# Patient Record
Sex: FEMALE | Race: BLACK OR AFRICAN AMERICAN | NOT HISPANIC OR LATINO
[De-identification: names, ages, dates, MRNs, and addresses within clinical notes are randomized per-mention and may not be internally consistent; named-entity substitution may affect disease eponyms.]

---

## 2017-02-14 ENCOUNTER — RESULT REVIEW (OUTPATIENT)
Age: 52
End: 2017-02-14

## 2017-02-15 ENCOUNTER — OTHER (OUTPATIENT)
Age: 52
End: 2017-02-15

## 2017-04-11 ENCOUNTER — OUTPATIENT (OUTPATIENT)
Dept: OUTPATIENT SERVICES | Facility: HOSPITAL | Age: 52
LOS: 1 days | End: 2017-04-11
Payer: COMMERCIAL

## 2017-04-11 VITALS
RESPIRATION RATE: 16 BRPM | SYSTOLIC BLOOD PRESSURE: 143 MMHG | TEMPERATURE: 99 F | WEIGHT: 169.09 LBS | DIASTOLIC BLOOD PRESSURE: 82 MMHG | HEIGHT: 66 IN

## 2017-04-11 DIAGNOSIS — Z98.89 OTHER SPECIFIED POSTPROCEDURAL STATES: Chronic | ICD-10-CM

## 2017-04-11 DIAGNOSIS — D25.0 SUBMUCOUS LEIOMYOMA OF UTERUS: ICD-10-CM

## 2017-04-11 DIAGNOSIS — Z90.89 ACQUIRED ABSENCE OF OTHER ORGANS: Chronic | ICD-10-CM

## 2017-04-11 DIAGNOSIS — Z01.818 ENCOUNTER FOR OTHER PREPROCEDURAL EXAMINATION: ICD-10-CM

## 2017-04-11 DIAGNOSIS — D25.9 LEIOMYOMA OF UTERUS, UNSPECIFIED: ICD-10-CM

## 2017-04-11 LAB
ANION GAP SERPL CALC-SCNC: 10 MMOL/L — SIGNIFICANT CHANGE UP (ref 5–17)
BUN SERPL-MCNC: 10 MG/DL — SIGNIFICANT CHANGE UP (ref 7–23)
CALCIUM SERPL-MCNC: 9 MG/DL — SIGNIFICANT CHANGE UP (ref 8.5–10.1)
CHLORIDE SERPL-SCNC: 107 MMOL/L — SIGNIFICANT CHANGE UP (ref 96–108)
CO2 SERPL-SCNC: 24 MMOL/L — SIGNIFICANT CHANGE UP (ref 22–31)
CREAT SERPL-MCNC: 0.87 MG/DL — SIGNIFICANT CHANGE UP (ref 0.5–1.3)
GLUCOSE SERPL-MCNC: 97 MG/DL — SIGNIFICANT CHANGE UP (ref 70–99)
HCG SERPL-ACNC: 2 MIU/ML — SIGNIFICANT CHANGE UP
HCT VFR BLD CALC: 23.8 % — LOW (ref 34.5–45)
HGB BLD-MCNC: 6.3 G/DL — CRITICAL LOW (ref 11.5–15.5)
MCHC RBC-ENTMCNC: 16.6 PG — LOW (ref 27–34)
MCHC RBC-ENTMCNC: 26.4 GM/DL — LOW (ref 32–36)
MCV RBC AUTO: 63.1 FL — LOW (ref 80–100)
PLATELET # BLD AUTO: 572 K/UL — HIGH (ref 150–400)
POTASSIUM SERPL-MCNC: 3.7 MMOL/L — SIGNIFICANT CHANGE UP (ref 3.5–5.3)
POTASSIUM SERPL-SCNC: 3.7 MMOL/L — SIGNIFICANT CHANGE UP (ref 3.5–5.3)
RBC # BLD: 3.78 M/UL — LOW (ref 3.8–5.2)
RBC # FLD: 17.9 % — HIGH (ref 10.3–14.5)
SODIUM SERPL-SCNC: 141 MMOL/L — SIGNIFICANT CHANGE UP (ref 135–145)
WBC # BLD: 7.6 K/UL — SIGNIFICANT CHANGE UP (ref 3.8–10.5)
WBC # FLD AUTO: 7.6 K/UL — SIGNIFICANT CHANGE UP (ref 3.8–10.5)

## 2017-04-11 PROCEDURE — 93010 ELECTROCARDIOGRAM REPORT: CPT | Mod: NC

## 2017-04-11 PROCEDURE — 80048 BASIC METABOLIC PNL TOTAL CA: CPT

## 2017-04-11 PROCEDURE — 93005 ELECTROCARDIOGRAM TRACING: CPT

## 2017-04-11 PROCEDURE — 86900 BLOOD TYPING SEROLOGIC ABO: CPT

## 2017-04-11 PROCEDURE — 85027 COMPLETE CBC AUTOMATED: CPT

## 2017-04-11 PROCEDURE — 86901 BLOOD TYPING SEROLOGIC RH(D): CPT

## 2017-04-11 PROCEDURE — G0463: CPT

## 2017-04-11 PROCEDURE — 84702 CHORIONIC GONADOTROPIN TEST: CPT

## 2017-04-11 PROCEDURE — 86850 RBC ANTIBODY SCREEN: CPT

## 2017-04-11 NOTE — H&P PST ADULT - NEGATIVE CARDIOVASCULAR SYMPTOMS
no orthopnea/no dyspnea on exertion/no peripheral edema/no paroxysmal nocturnal dyspnea/no chest pain/no palpitations/no claudication

## 2017-04-11 NOTE — H&P PST ADULT - NSANTHOSAYNRD_GEN_A_CORE
No. ANIKA screening performed.  STOP BANG Legend: 0-2 = LOW Risk; 3-4 = INTERMEDIATE Risk; 5-8 = HIGH Risk

## 2017-04-11 NOTE — H&P PST ADULT - HISTORY OF PRESENT ILLNESS
50 y/o female with h/o uterine fibroid, had D&C in Oct 2017, was very anemic at that time, stopped the heavy bleeds for little while, then started bleeding again since April 2017 until today, feels the blood count is going down too. Denies any palpitation or chest pain. Had 2 untis of packed cells in Oct 2016.   Now in PST pre op total abdominal hysterectomy and bilateral salpingectomy. pre op testing today

## 2017-04-11 NOTE — H&P PST ADULT - RS GEN PE MLT RESP DETAILS PC
good air movement/respirations non-labored/normal/breath sounds equal/airway patent/clear to auscultation bilaterally

## 2017-04-11 NOTE — H&P PST ADULT - ASSESSMENT
50 y/o female with c/o of heavy menstrual bleeding , diagnosed with uterine fibroids, had d&C in the past, continues to bleed, now scheduled for total abdominal hysterectomy and salpingectomy. Pre op testing today. Medical eval advised.

## 2017-04-11 NOTE — H&P PST ADULT - NEGATIVE GASTROINTESTINAL SYMPTOMS
no constipation/no nausea/no vomiting/no abdominal pain/no diarrhea/no steatorrhea/no jaundice/no hematochezia/no hiccoughs/no melena

## 2017-04-11 NOTE — H&P PST ADULT - FAMILY HISTORY
Mother  Still living? Unknown  Family history of diabetes mellitus, Age at diagnosis: Age Unknown  Family history of hypertension, Age at diagnosis: Age Unknown

## 2017-04-13 ENCOUNTER — INPATIENT (INPATIENT)
Facility: HOSPITAL | Age: 52
LOS: 2 days | Discharge: HOME | DRG: 743 | End: 2017-04-16
Attending: INTERNAL MEDICINE | Admitting: INTERNAL MEDICINE
Payer: COMMERCIAL

## 2017-04-13 ENCOUNTER — RESULT REVIEW (OUTPATIENT)
Age: 52
End: 2017-04-13

## 2017-04-13 VITALS
DIASTOLIC BLOOD PRESSURE: 64 MMHG | HEART RATE: 83 BPM | RESPIRATION RATE: 13 BRPM | TEMPERATURE: 99 F | WEIGHT: 169.09 LBS | HEIGHT: 66 IN | SYSTOLIC BLOOD PRESSURE: 109 MMHG

## 2017-04-13 DIAGNOSIS — Z98.89 OTHER SPECIFIED POSTPROCEDURAL STATES: Chronic | ICD-10-CM

## 2017-04-13 DIAGNOSIS — D25.9 LEIOMYOMA OF UTERUS, UNSPECIFIED: ICD-10-CM

## 2017-04-13 DIAGNOSIS — E87.6 HYPOKALEMIA: ICD-10-CM

## 2017-04-13 DIAGNOSIS — Z29.9 ENCOUNTER FOR PROPHYLACTIC MEASURES, UNSPECIFIED: ICD-10-CM

## 2017-04-13 DIAGNOSIS — N93.9 ABNORMAL UTERINE AND VAGINAL BLEEDING, UNSPECIFIED: ICD-10-CM

## 2017-04-13 DIAGNOSIS — I10 ESSENTIAL (PRIMARY) HYPERTENSION: ICD-10-CM

## 2017-04-13 DIAGNOSIS — D64.89 OTHER SPECIFIED ANEMIAS: ICD-10-CM

## 2017-04-13 DIAGNOSIS — Z90.89 ACQUIRED ABSENCE OF OTHER ORGANS: Chronic | ICD-10-CM

## 2017-04-13 LAB
ALBUMIN SERPL ELPH-MCNC: 3.6 G/DL — SIGNIFICANT CHANGE UP (ref 3.3–5)
ALP SERPL-CCNC: 51 U/L — SIGNIFICANT CHANGE UP (ref 40–120)
ALT FLD-CCNC: 17 U/L — SIGNIFICANT CHANGE UP (ref 12–78)
ANION GAP SERPL CALC-SCNC: 10 MMOL/L — SIGNIFICANT CHANGE UP (ref 5–17)
ANISOCYTOSIS BLD QL: SLIGHT — SIGNIFICANT CHANGE UP
APPEARANCE UR: CLEAR — SIGNIFICANT CHANGE UP
APTT BLD: 23.8 SEC — LOW (ref 27.5–37.4)
AST SERPL-CCNC: 10 U/L — LOW (ref 15–37)
BACTERIA # UR AUTO: ABNORMAL
BASOPHILS # BLD AUTO: 0.1 K/UL — SIGNIFICANT CHANGE UP (ref 0–0.2)
BASOPHILS NFR BLD AUTO: 1 % — SIGNIFICANT CHANGE UP (ref 0–2)
BILIRUB SERPL-MCNC: 1.2 MG/DL — SIGNIFICANT CHANGE UP (ref 0.2–1.2)
BILIRUB UR-MCNC: NEGATIVE — SIGNIFICANT CHANGE UP
BLD GP AB SCN SERPL QL: SIGNIFICANT CHANGE UP
BUN SERPL-MCNC: 10 MG/DL — SIGNIFICANT CHANGE UP (ref 7–23)
CALCIUM SERPL-MCNC: 8.7 MG/DL — SIGNIFICANT CHANGE UP (ref 8.5–10.1)
CHLORIDE SERPL-SCNC: 105 MMOL/L — SIGNIFICANT CHANGE UP (ref 96–108)
CO2 SERPL-SCNC: 26 MMOL/L — SIGNIFICANT CHANGE UP (ref 22–31)
COLOR SPEC: YELLOW — SIGNIFICANT CHANGE UP
CREAT SERPL-MCNC: 0.9 MG/DL — SIGNIFICANT CHANGE UP (ref 0.5–1.3)
DACRYOCYTES BLD QL SMEAR: SLIGHT — SIGNIFICANT CHANGE UP
DIFF PNL FLD: NEGATIVE — SIGNIFICANT CHANGE UP
ELLIPTOCYTES BLD QL SMEAR: SIGNIFICANT CHANGE UP
EOSINOPHIL # BLD AUTO: 0 K/UL — SIGNIFICANT CHANGE UP (ref 0–0.5)
EOSINOPHIL NFR BLD AUTO: 0.4 % — SIGNIFICANT CHANGE UP (ref 0–6)
EPI CELLS # UR: SIGNIFICANT CHANGE UP
GLUCOSE SERPL-MCNC: 85 MG/DL — SIGNIFICANT CHANGE UP (ref 70–99)
GLUCOSE UR QL: NEGATIVE — SIGNIFICANT CHANGE UP
HCG SERPL-ACNC: 1 MIU/ML — SIGNIFICANT CHANGE UP
HCT VFR BLD CALC: 22.9 % — LOW (ref 34.5–45)
HGB BLD-MCNC: 6.3 G/DL — CRITICAL LOW (ref 11.5–15.5)
HYPOCHROMIA BLD QL: SIGNIFICANT CHANGE UP
INR BLD: 1.24 RATIO — HIGH (ref 0.88–1.16)
KETONES UR-MCNC: NEGATIVE — SIGNIFICANT CHANGE UP
LEUKOCYTE ESTERASE UR-ACNC: NEGATIVE — SIGNIFICANT CHANGE UP
LYMPHOCYTES # BLD AUTO: 1.7 K/UL — SIGNIFICANT CHANGE UP (ref 1–3.3)
LYMPHOCYTES # BLD AUTO: 20.7 % — SIGNIFICANT CHANGE UP (ref 13–44)
MCHC RBC-ENTMCNC: 17.4 PG — LOW (ref 27–34)
MCHC RBC-ENTMCNC: 27.6 GM/DL — LOW (ref 32–36)
MCV RBC AUTO: 62.8 FL — LOW (ref 80–100)
MICROCYTES BLD QL: SLIGHT — SIGNIFICANT CHANGE UP
MONOCYTES # BLD AUTO: 1.3 K/UL — HIGH (ref 0–0.9)
MONOCYTES NFR BLD AUTO: 15.6 % — HIGH (ref 1–9)
NEUTROPHILS # BLD AUTO: 5.2 K/UL — SIGNIFICANT CHANGE UP (ref 1.8–7.4)
NEUTROPHILS NFR BLD AUTO: 62.3 % — SIGNIFICANT CHANGE UP (ref 43–77)
NITRITE UR-MCNC: NEGATIVE — SIGNIFICANT CHANGE UP
PH UR: 5 — SIGNIFICANT CHANGE UP (ref 4.8–8)
PLAT MORPH BLD: NORMAL — SIGNIFICANT CHANGE UP
PLATELET # BLD AUTO: 711 K/UL — HIGH (ref 150–400)
POIKILOCYTOSIS BLD QL AUTO: SIGNIFICANT CHANGE UP
POLYCHROMASIA BLD QL SMEAR: SLIGHT — SIGNIFICANT CHANGE UP
POTASSIUM SERPL-MCNC: 3.2 MMOL/L — LOW (ref 3.5–5.3)
POTASSIUM SERPL-SCNC: 3.2 MMOL/L — LOW (ref 3.5–5.3)
PROT SERPL-MCNC: 7.1 G/DL — SIGNIFICANT CHANGE UP (ref 6–8.3)
PROT UR-MCNC: 25 MG/DL
PROTHROM AB SERPL-ACNC: 13.6 SEC — HIGH (ref 9.8–12.7)
RBC # BLD: 3.65 M/UL — LOW (ref 3.8–5.2)
RBC # FLD: 18 % — HIGH (ref 10.3–14.5)
RBC BLD AUTO: ABNORMAL
SODIUM SERPL-SCNC: 141 MMOL/L — SIGNIFICANT CHANGE UP (ref 135–145)
SP GR SPEC: 1.02 — SIGNIFICANT CHANGE UP (ref 1.01–1.02)
TARGETS BLD QL SMEAR: SLIGHT — SIGNIFICANT CHANGE UP
UROBILINOGEN FLD QL: 1
WBC # BLD: 8.4 K/UL — SIGNIFICANT CHANGE UP (ref 3.8–10.5)
WBC # FLD AUTO: 8.4 K/UL — SIGNIFICANT CHANGE UP (ref 3.8–10.5)
WBC UR QL: SIGNIFICANT CHANGE UP

## 2017-04-13 PROCEDURE — 93010 ELECTROCARDIOGRAM REPORT: CPT

## 2017-04-13 PROCEDURE — 99223 1ST HOSP IP/OBS HIGH 75: CPT | Mod: AI,GC

## 2017-04-13 PROCEDURE — 99285 EMERGENCY DEPT VISIT HI MDM: CPT

## 2017-04-13 PROCEDURE — 71020: CPT | Mod: 26

## 2017-04-13 RX ORDER — ACETAMINOPHEN 500 MG
650 TABLET ORAL ONCE
Qty: 0 | Refills: 0 | Status: COMPLETED | OUTPATIENT
Start: 2017-04-13 | End: 2017-04-13

## 2017-04-13 RX ORDER — SODIUM CHLORIDE 9 MG/ML
1000 INJECTION INTRAMUSCULAR; INTRAVENOUS; SUBCUTANEOUS ONCE
Qty: 0 | Refills: 0 | Status: COMPLETED | OUTPATIENT
Start: 2017-04-13 | End: 2017-04-13

## 2017-04-13 RX ORDER — FEXOFENADINE HCL 30 MG
1 TABLET ORAL
Qty: 0 | Refills: 0 | COMMUNITY

## 2017-04-13 RX ORDER — SODIUM CHLORIDE 9 MG/ML
1000 INJECTION INTRAMUSCULAR; INTRAVENOUS; SUBCUTANEOUS
Qty: 0 | Refills: 0 | Status: DISCONTINUED | OUTPATIENT
Start: 2017-04-13 | End: 2017-04-13

## 2017-04-13 RX ORDER — IBUPROFEN 200 MG
0 TABLET ORAL
Qty: 0 | Refills: 0 | COMMUNITY

## 2017-04-13 RX ORDER — TRANEXAMIC ACID 100 MG/ML
1300 INJECTION, SOLUTION INTRAVENOUS THREE TIMES A DAY
Qty: 0 | Refills: 0 | Status: DISCONTINUED | OUTPATIENT
Start: 2017-04-13 | End: 2017-04-14

## 2017-04-13 RX ORDER — ACETAMINOPHEN 500 MG
650 TABLET ORAL EVERY 6 HOURS
Qty: 0 | Refills: 0 | Status: DISCONTINUED | OUTPATIENT
Start: 2017-04-13 | End: 2017-04-14

## 2017-04-13 RX ORDER — SODIUM CHLORIDE 9 MG/ML
3 INJECTION INTRAMUSCULAR; INTRAVENOUS; SUBCUTANEOUS ONCE
Qty: 0 | Refills: 0 | Status: COMPLETED | OUTPATIENT
Start: 2017-04-13 | End: 2017-04-13

## 2017-04-13 RX ORDER — ACETAMINOPHEN 500 MG
650 TABLET ORAL ONCE
Qty: 0 | Refills: 0 | Status: DISCONTINUED | OUTPATIENT
Start: 2017-04-13 | End: 2017-04-13

## 2017-04-13 RX ORDER — ONDANSETRON 8 MG/1
4 TABLET, FILM COATED ORAL EVERY 6 HOURS
Qty: 0 | Refills: 0 | Status: DISCONTINUED | OUTPATIENT
Start: 2017-04-13 | End: 2017-04-14

## 2017-04-13 RX ORDER — CEFAZOLIN SODIUM 1 G
2000 VIAL (EA) INJECTION ONCE
Qty: 0 | Refills: 0 | Status: DISCONTINUED | OUTPATIENT
Start: 2017-04-13 | End: 2017-04-13

## 2017-04-13 RX ORDER — POTASSIUM CHLORIDE 20 MEQ
40 PACKET (EA) ORAL ONCE
Qty: 0 | Refills: 0 | Status: COMPLETED | OUTPATIENT
Start: 2017-04-13 | End: 2017-04-13

## 2017-04-13 RX ADMIN — SODIUM CHLORIDE 1000 MILLILITER(S): 9 INJECTION INTRAMUSCULAR; INTRAVENOUS; SUBCUTANEOUS at 16:58

## 2017-04-13 RX ADMIN — Medication 650 MILLIGRAM(S): at 22:51

## 2017-04-13 RX ADMIN — Medication 40 MILLIEQUIVALENT(S): at 20:27

## 2017-04-13 RX ADMIN — SODIUM CHLORIDE 3 MILLILITER(S): 9 INJECTION INTRAMUSCULAR; INTRAVENOUS; SUBCUTANEOUS at 16:57

## 2017-04-13 NOTE — ED ADULT NURSE NOTE - OBJECTIVE STATEMENT
Pt to ED c/o vaginal bleed, scheduled for histo tmrw was called by PMD for low HGB/HCT, DNC in 1/17, afebrile, skin intact, will continue to monitor

## 2017-04-13 NOTE — H&P ADULT - PMH
Anemia    Environmental allergies    Fibrocystic breast changes    Fibroid    HTN (hypertension)    Leiomyoma of uterus, unspecified

## 2017-04-13 NOTE — ED PROVIDER NOTE - MEDICAL DECISION MAKING DETAILS
51 female with heavy vaginal bleeding, needs blood transfusion, admission for NILAM, f/u labs, ekg, chest xray

## 2017-04-13 NOTE — H&P ADULT - FAMILY HISTORY
Mother  Still living? Unknown  Family history of diabetes mellitus, Age at diagnosis: Age Unknown  Family history of hypertension, Age at diagnosis: Age Unknown  Family history of hyperlipidemia, Age at diagnosis: Age Unknown  Family history of heart disease, Age at diagnosis: Age Unknown

## 2017-04-13 NOTE — ED PROVIDER NOTE - PMH
Anemia    Environmental allergies    Fibroid    HTN (hypertension)    Leiomyoma of uterus, unspecified

## 2017-04-13 NOTE — H&P ADULT - HISTORY OF PRESENT ILLNESS
50 yo F with PMH of HTN, Migraines, Fibrocystic breast changes presented to the ED with complaints of worsening vaginal bleeding. Pt has experienced vaginal bleeding over the past 5-6 years. Bleeding began to worsen in August-Sept 2016, which she had gone to the ED and received 3 units pRBCs before being sent home. In Oct 2016, she had a D&C, and her vaginal bleeding had returned to baseline, as it remained but was manageable, until April 2017. Vaginal bleeding has worsened over the past two weeks and patient had been managed outpatient with her GYN Dr. Dumont. Scheduled for NILAM tomorrow at [facility], but due to complaints of nausea, vomiting, dyspnea on exertion, dizziness, fatigue, dry cough, lack of appetite, and headache, patient came to the ED. Denies fever, chills, chest pain, abd pain, constipation, or diarrhea. Otherwise, patient remains stable without further complaints. 52 yo F with PMH of HTN, Migraines, Fibrocystic breast changes presented to the ED with complaints of worsening vaginal bleeding. Pt has experienced vaginal bleeding over the past 5-6 years. Bleeding began to worsen in August-Sept 2016, at which point she had gone to the ED and received 3 units pRBCs before being sent home. In Oct 2016, she had a D&C, and her vaginal bleeding had returned to baseline, as it remained but was manageable, until April 2017. Vaginal bleeding has worsened over the past two weeks and patient had been managed outpatient with her GYN Dr. Dumont. Scheduled for NILAM tomorrow at [facility] [place], but due to complaints of nausea, vomiting, dyspnea on exertion, dizziness, fatigue, dry cough, lack of appetite, and headache, patient came to the ED. Denies fever, chills, chest pain, abd pain, constipation, or diarrhea. Otherwise, patient remains stable without further complaints.    In the ED:  VS hemodynamically stable.   EKG showed NSR at 74.   CXR negative.   Labs: H/H 6.3/23.9, Plt 711, INR 1.24, K 3.2.   Received NS 1L Bolus x1, KCl 40mEq PO x1. 52 yo F with PMH of HTN, Migraines, Fibrocystic breast changes, uterine fibroids presented to the ED with complaints of worsening vaginal bleeding. Pt has experienced vaginal bleeding over the past 5-6 years. Bleeding began to worsen in August-Sept 2016, at which point she had gone to the ED and received 3 units pRBCs before being sent home. In Oct 2016, she had a D&C, and her vaginal bleeding had returned to baseline, as it remained but was manageable, until April 2017. Vaginal bleeding has worsened over the past two weeks and patient had been managed outpatient with her GYN Dr. Dumont. Scheduled for NILAM tomorrow at [facility], but due to complaints of nausea, vomiting, dyspnea on exertion, dizziness, fatigue, dry cough, lack of appetite, and headache, patient came to the ED. Denies fever, chills, chest pain, abd pain, palpitations, constipation, or diarrhea. Otherwise, patient remains stable without further complaints.    In the ED:  VS hemodynamically stable.   EKG showed NSR at 74.   CXR negative.   Labs: H/H 6.3/23.9, Plt 711, INR 1.24, K 3.2.   Received NS 1L Bolus x1, KCl 40mEq PO x1.

## 2017-04-13 NOTE — H&P ADULT - ATTENDING COMMENTS
Patient was seen and examined with medicine team. Agree with above history and physical, assessment and plan. Edited where appropriate.     52 yo F with PMH of HTN, Migraines, Fibrocystic breast changes, uterine fibroids admitted to Tele with Symptomatic anemia 2/2 vaginal bleeding. Transfuse 2 units prbc and monitor h/h. Plan for hysterectomy in AM on 4/14/17. Case discussed with  (ob/gyn). Attempted to call pmd Dr.Sonia Butelr however voicemail was unavailable. Will monitor patient closely on telemetry floor.

## 2017-04-13 NOTE — H&P ADULT - NSHPPHYSICALEXAM_GEN_ALL_CORE
General: Well developed, well nourished, NAD  HEENT: NCAT, PERRLA, EOMI bl, moist mucous membranes   Neck: Supple, nontender, no mass  Neurology: A&Ox3, nonfocal, CN II-XII grossly intact, sensation intact, no gait abnormalities   Respiratory: CTA B/L, No W/R/R  CV: RRR, +S1/S2, no murmurs, rubs or gallops  Abdominal: +Mild tenderness to palpation in lower quadrants b/l, Soft, ND +BSx4  Extremities: No C/C/E, + peripheral pulses  MSK: Normal ROM, no joint erythema or warmth, no joint swelling   Skin: warm, dry, normal color, no rash or abnormal lesions Vital Signs Last 24 Hrs  T(C): 37.3, Max: 37.3 (04-13 @ 15:13)  T(F): 99.1, Max: 99.1 (04-13 @ 15:13)  HR: 83 (83 - 83)  BP: 109/64 (109/64 - 109/64)  BP(mean): --  RR: 13 (13 - 13)  SpO2: --    General: Well developed, well nourished, NAD  HEENT: NCAT, PERRLA, EOMI bl, moist mucous membranes   Neck: Supple, nontender, no mass  Neurology: A&Ox3, nonfocal, CN II-XII grossly intact, sensation intact, no gait abnormalities   Respiratory: CTA B/L, No W/R/R  CV: RRR, +S1/S2, no murmurs, rubs or gallops  Abdominal: +Mild tenderness to palpation in lower quadrants b/l, Soft, ND +BSx4  Extremities: No C/C/E, + peripheral pulses  MSK: Normal ROM, no joint erythema or warmth, no joint swelling   Skin: warm, dry, normal color, no rash or abnormal lesions Vital Signs Last 24 Hrs  T(C): 37.3, Max: 37.3 (04-13 @ 15:13)  T(F): 99.1, Max: 99.1 (04-13 @ 15:13)  HR: 83 (83 - 83)  BP: 109/64 (109/64 - 109/64)  BP(mean): --  RR: 13 (13 - 13)  SpO2: 100% on RA     General: Well developed, well nourished, NAD  HEENT: NCAT, PERRLA, EOMI bl, moist mucous membranes, pale conjunctiva b/l    Neck: Supple, nontender, no mass  Neurology: A&Ox3, nonfocal, CN II-XII grossly intact, sensation intact, no gait abnormalities   Respiratory: CTA B/L, No W/R/R  CV: RRR, +S1/S2, no murmurs, rubs or gallops  Abdominal: +Mild tenderness to palpation in lower quadrants b/l, Soft, ND +BSx4, no rebound or guarding   Extremities: No C/C/E, + peripheral pulses  MSK: Normal ROM, no joint erythema or warmth, no joint swelling   Skin: warm, dry, normal color, no rash or abnormal lesions

## 2017-04-13 NOTE — H&P ADULT - NSHPREVIEWOFSYSTEMS_GEN_ALL_CORE
Constitutional: denies fever, chills, general malaise, weight loss, weight gain, diaphoresis   HEENT: denies dry mouth, sore throat, runny nose, photophobia, blurry vision, double vision, discharge, eye pain, difficulty hearing, vertigo, dysphagia, epistaxis, recent dental work    Respiratory: denies SOB, MUNGUIA, cough, sputum production, wheezing, hemoptysis  Cardiovascular: denies CP, palpitations, edema  Gastrointestinal: denies nausea, vomiting, diarrhea, constipation, abdominal pain, melena, hematochezia   Genitourinary: denies dysuria, frequency, urgency, incontinence, hematuria   Skin/Breast: denies rash, hives, itching, masses, hair loss   Musculoskeletal: denies myalgias, arthritis, joint swelling, muscle weakness  Neurologic: denies syncope, LOC, headache, weakness, dizziness, paresthesias, numbness, seizures, confusion, dementia   Psychiatric: denies feeling anxious, depressed, suicidal, homicidal thoughts  Endocrine: denies increased fingerstick glucoses, cold or heat intolerance, polydipsia, polyuria, polyphagia   Hematology/Oncology: denies bruising, tender or enlarged lymph nodes   ROS negative except as noted above Constitutional: +Fatigue; denies fever, chills, general malaise, weight loss, weight gain, diaphoresis   HEENT: denies dry mouth, sore throat, runny nose, photophobia, blurry vision, double vision, discharge, eye pain, difficulty hearing, vertigo, dysphagia, epistaxis, recent dental work    Respiratory: +MUNGUIA, nonproductive cough; denies SOB, cough, sputum production, wheezing, hemoptysis  Cardiovascular: denies CP, palpitations, edema  Gastrointestinal: +N/V, lack of appetite; denies diarrhea, constipation, abdominal pain, melena, hematochezia   Genitourinary: denies dysuria, frequency, urgency, incontinence, hematuria   Skin/Breast: denies rash, hives, itching, masses, hair loss   Musculoskeletal: denies myalgias, arthritis, joint swelling, muscle weakness  Neurologic: +Dizziness, headache; denies syncope, LOC, weakness, paresthesias, numbness, seizures, confusion, dementia   Psychiatric: denies feeling anxious, depressed, suicidal, homicidal thoughts  Endocrine: denies increased fingerstick glucoses, cold or heat intolerance, polydipsia, polyuria, polyphagia   Hematology/Oncology: denies bruising, tender or enlarged lymph nodes   ROS negative except as noted above

## 2017-04-13 NOTE — H&P ADULT - PROBLEM SELECTOR PLAN 1
-2/2 Vaginal Bleeding 2/2 Fibroids.  -Admit to Tele.  -Ordered 2 units pRBCs, will transfuse with additional units as needed.  -Trend H/H.  -Scheduled for NILAM tomorrow.  -DASH diet, NPO after midnight.  -Tylenol prn fever, pain.  -Zofran prn n/v.  -c/w Lysteda (advised to bring in home medication as not carried by pharmacy).  -f/u AM labs.  -f/u GYN recs. -2/2 Vaginal Bleeding 2/2 uterine Fibroids.  -Admit to Tele.  -Ordered 2 units pRBCs, will transfuse with additional units as needed.  -Trend H/H.  -Scheduled for NILAM tomorrow.  -DASH diet, NPO after midnight.  -Tylenol prn fever, pain.  -Zofran prn n/v.  -c/w Lysteda (advised to bring in home medication as not carried by pharmacy).  -f/u AM labs.  -f/u GYN recs-

## 2017-04-13 NOTE — ED PROVIDER NOTE - OBJECTIVE STATEMENT
51 female h/o fibroids sent to ER by Dr. Dumont GYN for admission for NILAM. Patient states she has been having heavy vaginal bleeding since 4/1/17, with clots, has been feeling light headed, dizziness, shortness of breath with exertion, nausea, at times vomiting, decreased appetite, feels better with rest, had gone to see Dr. Dumont on Monday, had pre-surgical testing done on Tuesday, received a phone call that her Hg was 6.3 and told to go to ER for admission. Patient states today vaginal bleeding is not as heavy, has mild lower abdominal cramping.

## 2017-04-13 NOTE — H&P ADULT - PROBLEM SELECTOR PLAN 4
-BP stable in ED.  -Will hold HTN meds at this time.  -Monitor VS. -BP stable in ED.  -Will hold HTN meds at this time and restart in AM if vitals stable. Patient also took her BP meds at home this AM   -Monitor VS.

## 2017-04-13 NOTE — H&P ADULT - ASSESSMENT
52 yo F with PMH of HTN, Migraines, Fibrocystic breast changes admitted to Tele with Symptomatic anemia 2/2 vaginal bleeding. 50 yo F with PMH of HTN, Migraines, Fibrocystic breast changes, uterine fibroids admitted to Tele with Symptomatic anemia 2/2 vaginal bleeding.

## 2017-04-13 NOTE — H&P ADULT - NSHPSOCIALHISTORY_GEN_ALL_CORE
Social History:    Marital Status:   Occupation:   Lives with:     Substance Use :  Tobacco Usage:  (   ) never smoked   (   ) former smoker   (   ) current smoker  (     ) pack year  (        ) last tobacco use date  Alcohol Usage:      Health Management     For female:   Last Mammo:   Last Pap:     For male:  Last prostate exam:          [  ] date:            (  ) findings      Immunization Hx:   (  ) flu shot                               (     ) date   (  ) pneumonia shot               (     ) date  (  ) tetanus                               (     ) date     (     ) Advanced Directives: (     ) declined   [  ] health care proxy 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    Substance Use :  Tobacco Usage:  ( X ) never smoked   (   ) former smoker   (   ) current smoker  (     ) pack year  (        ) last tobacco use date  Alcohol Usage: Social, weekly, wine      Health Management     For female:   Last Mammo: 3-4 years ago, fibrocystic breast changes  Last Pap: 2-3 years ago, abnormal, does not remember specific details      Immunization Hx:   (  ) flu shot                               (     ) date   (  ) pneumonia shot               (     ) date  (  ) tetanus                               (     ) date     (     ) Advanced Directives: (     ) declined   [  ] health care proxy 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    Substance Use :  Tobacco Usage:  ( X ) never smoked   (   ) former smoker   (   ) current smoker  (     ) pack year  (        ) last tobacco use date  Alcohol Usage: Social, weekly, wine      Health Management     For female:   Last Mammo: 3-4 years ago, fibrocystic breast changes  Last Pap: 2-3 years ago, abnormal, does not remember specific details      Immunization Hx:   (  ) flu shot                               (     ) date   (  ) pneumonia shot               (     ) date  (  ) tetanus                               (     ) date     (     ) Advanced Directives: (     ) declined   [  ] health care proxy - None officially, but patient states that she wishes for her step-father to serve as her HCP. 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    Substance Use :  Tobacco Usage:  ( X ) never smoked   (   ) former smoker   (   ) current smoker  (     ) pack year  (        ) last tobacco use date  Alcohol Usage: Social, weekly, wine  denies recreational drug use     Health Management     For female:   Last Mammo: 3-4 years ago, fibrocystic breast changes  Last Pap: 2-3 years ago, abnormal, does not remember specific details      Immunization Hx:   (  ) flu shot                               (     ) date   (  ) pneumonia shot               (     ) date  (  ) tetanus                               (     ) date     (     ) Advanced Directives: (     ) declined   [  ] health care proxy - None officially, but patient states that she wishes for her step-father to serve as her HCP.

## 2017-04-13 NOTE — ED PROVIDER NOTE - CARE PLAN
Principal Discharge DX:	Vaginal bleeding  Secondary Diagnosis:	Anemia due to other cause, not classified

## 2017-04-13 NOTE — ED ADULT NURSE NOTE - PMH
Anemia    Fibroid    HTN (hypertension)    Leiomyoma of uterus, unspecified Anemia    Environmental allergies    Fibroid    HTN (hypertension)    Leiomyoma of uterus, unspecified

## 2017-04-14 ENCOUNTER — TRANSCRIPTION ENCOUNTER (OUTPATIENT)
Age: 52
End: 2017-04-14

## 2017-04-14 LAB
ALBUMIN SERPL ELPH-MCNC: 3.1 G/DL — LOW (ref 3.3–5)
ALP SERPL-CCNC: 41 U/L — SIGNIFICANT CHANGE UP (ref 40–120)
ALT FLD-CCNC: 14 U/L — SIGNIFICANT CHANGE UP (ref 12–78)
ANION GAP SERPL CALC-SCNC: 7 MMOL/L — SIGNIFICANT CHANGE UP (ref 5–17)
APTT BLD: 25.2 SEC — LOW (ref 27.5–37.4)
APTT BLD: 26.1 SEC — LOW (ref 27.5–37.4)
AST SERPL-CCNC: 12 U/L — LOW (ref 15–37)
BASOPHILS # BLD AUTO: 0 K/UL — SIGNIFICANT CHANGE UP (ref 0–0.2)
BASOPHILS NFR BLD AUTO: 0.8 % — SIGNIFICANT CHANGE UP (ref 0–2)
BILIRUB SERPL-MCNC: 2.4 MG/DL — HIGH (ref 0.2–1.2)
BUN SERPL-MCNC: 6 MG/DL — LOW (ref 7–23)
CALCIUM SERPL-MCNC: 8.2 MG/DL — LOW (ref 8.5–10.1)
CHLORIDE SERPL-SCNC: 108 MMOL/L — SIGNIFICANT CHANGE UP (ref 96–108)
CO2 SERPL-SCNC: 27 MMOL/L — SIGNIFICANT CHANGE UP (ref 22–31)
CREAT SERPL-MCNC: 0.72 MG/DL — SIGNIFICANT CHANGE UP (ref 0.5–1.3)
CULTURE RESULTS: NO GROWTH — SIGNIFICANT CHANGE UP
EOSINOPHIL # BLD AUTO: 0.1 K/UL — SIGNIFICANT CHANGE UP (ref 0–0.5)
EOSINOPHIL NFR BLD AUTO: 0.9 % — SIGNIFICANT CHANGE UP (ref 0–6)
GLUCOSE SERPL-MCNC: 82 MG/DL — SIGNIFICANT CHANGE UP (ref 70–99)
HCT VFR BLD CALC: 25 % — LOW (ref 34.5–45)
HCT VFR BLD CALC: 25.7 % — LOW (ref 34.5–45)
HGB BLD-MCNC: 7.4 G/DL — LOW (ref 11.5–15.5)
HGB BLD-MCNC: 7.7 G/DL — LOW (ref 11.5–15.5)
INR BLD: 1.21 RATIO — HIGH (ref 0.88–1.16)
INR BLD: 1.24 RATIO — HIGH (ref 0.88–1.16)
INR BLD: 1.31 RATIO — HIGH (ref 0.88–1.16)
LYMPHOCYTES # BLD AUTO: 1.9 K/UL — SIGNIFICANT CHANGE UP (ref 1–3.3)
LYMPHOCYTES # BLD AUTO: 30.1 % — SIGNIFICANT CHANGE UP (ref 13–44)
MAGNESIUM SERPL-MCNC: 2.5 MG/DL — HIGH (ref 1.8–2.4)
MCHC RBC-ENTMCNC: 20.2 PG — LOW (ref 27–34)
MCHC RBC-ENTMCNC: 20.6 PG — LOW (ref 27–34)
MCHC RBC-ENTMCNC: 29.5 GM/DL — LOW (ref 32–36)
MCHC RBC-ENTMCNC: 30 GM/DL — LOW (ref 32–36)
MCV RBC AUTO: 68.4 FL — LOW (ref 80–100)
MCV RBC AUTO: 68.8 FL — LOW (ref 80–100)
MONOCYTES # BLD AUTO: 1 K/UL — HIGH (ref 0–0.9)
MONOCYTES NFR BLD AUTO: 16.2 % — HIGH (ref 1–9)
NEUTROPHILS # BLD AUTO: 3.2 K/UL — SIGNIFICANT CHANGE UP (ref 1.8–7.4)
NEUTROPHILS NFR BLD AUTO: 52 % — SIGNIFICANT CHANGE UP (ref 43–77)
PHOSPHATE SERPL-MCNC: 2.9 MG/DL — SIGNIFICANT CHANGE UP (ref 2.5–4.5)
PLATELET # BLD AUTO: 471 K/UL — HIGH (ref 150–400)
PLATELET # BLD AUTO: 473 K/UL — HIGH (ref 150–400)
POTASSIUM SERPL-MCNC: 3.8 MMOL/L — SIGNIFICANT CHANGE UP (ref 3.5–5.3)
POTASSIUM SERPL-SCNC: 3.8 MMOL/L — SIGNIFICANT CHANGE UP (ref 3.5–5.3)
PROT SERPL-MCNC: 5.9 G/DL — LOW (ref 6–8.3)
PROTHROM AB SERPL-ACNC: 13.2 SEC — HIGH (ref 9.8–12.7)
PROTHROM AB SERPL-ACNC: 13.6 SEC — HIGH (ref 9.8–12.7)
PROTHROM AB SERPL-ACNC: 14.4 SEC — HIGH (ref 9.8–12.7)
RBC # BLD: 3.66 M/UL — LOW (ref 3.8–5.2)
RBC # BLD: 3.74 M/UL — LOW (ref 3.8–5.2)
RBC # FLD: 23.1 % — HIGH (ref 10.3–14.5)
RBC # FLD: 23.3 % — HIGH (ref 10.3–14.5)
SODIUM SERPL-SCNC: 142 MMOL/L — SIGNIFICANT CHANGE UP (ref 135–145)
SPECIMEN SOURCE: SIGNIFICANT CHANGE UP
WBC # BLD: 6.2 K/UL — SIGNIFICANT CHANGE UP (ref 3.8–10.5)
WBC # BLD: 9.5 K/UL — SIGNIFICANT CHANGE UP (ref 3.8–10.5)
WBC # FLD AUTO: 6.2 K/UL — SIGNIFICANT CHANGE UP (ref 3.8–10.5)
WBC # FLD AUTO: 9.5 K/UL — SIGNIFICANT CHANGE UP (ref 3.8–10.5)

## 2017-04-14 PROCEDURE — 88307 TISSUE EXAM BY PATHOLOGIST: CPT | Mod: 26

## 2017-04-14 PROCEDURE — 88305 TISSUE EXAM BY PATHOLOGIST: CPT | Mod: 26

## 2017-04-14 PROCEDURE — 88108 CYTOPATH CONCENTRATE TECH: CPT | Mod: 26

## 2017-04-14 PROCEDURE — 99233 SBSQ HOSP IP/OBS HIGH 50: CPT | Mod: GC

## 2017-04-14 PROCEDURE — 88331 PATH CONSLTJ SURG 1 BLK 1SPC: CPT | Mod: 26

## 2017-04-14 RX ORDER — NALOXONE HYDROCHLORIDE 4 MG/.1ML
0.1 SPRAY NASAL
Qty: 0 | Refills: 0 | Status: DISCONTINUED | OUTPATIENT
Start: 2017-04-14 | End: 2017-04-16

## 2017-04-14 RX ORDER — ACETAMINOPHEN 500 MG
650 TABLET ORAL EVERY 6 HOURS
Qty: 0 | Refills: 0 | Status: DISCONTINUED | OUTPATIENT
Start: 2017-04-14 | End: 2017-04-16

## 2017-04-14 RX ORDER — SODIUM CHLORIDE 9 MG/ML
1000 INJECTION, SOLUTION INTRAVENOUS
Qty: 0 | Refills: 0 | Status: DISCONTINUED | OUTPATIENT
Start: 2017-04-14 | End: 2017-04-15

## 2017-04-14 RX ORDER — HYDROMORPHONE HYDROCHLORIDE 2 MG/ML
30 INJECTION INTRAMUSCULAR; INTRAVENOUS; SUBCUTANEOUS
Qty: 0 | Refills: 0 | Status: DISCONTINUED | OUTPATIENT
Start: 2017-04-14 | End: 2017-04-15

## 2017-04-14 RX ORDER — HYDROMORPHONE HYDROCHLORIDE 2 MG/ML
0.5 INJECTION INTRAMUSCULAR; INTRAVENOUS; SUBCUTANEOUS
Qty: 0 | Refills: 0 | Status: DISCONTINUED | OUTPATIENT
Start: 2017-04-14 | End: 2017-04-14

## 2017-04-14 RX ORDER — CEFAZOLIN SODIUM 1 G
2000 VIAL (EA) INJECTION ONCE
Qty: 0 | Refills: 0 | Status: COMPLETED | OUTPATIENT
Start: 2017-04-14 | End: 2017-04-14

## 2017-04-14 RX ORDER — ONDANSETRON 8 MG/1
4 TABLET, FILM COATED ORAL EVERY 6 HOURS
Qty: 0 | Refills: 0 | Status: DISCONTINUED | OUTPATIENT
Start: 2017-04-14 | End: 2017-04-16

## 2017-04-14 RX ORDER — ONDANSETRON 8 MG/1
4 TABLET, FILM COATED ORAL ONCE
Qty: 0 | Refills: 0 | Status: DISCONTINUED | OUTPATIENT
Start: 2017-04-14 | End: 2017-04-14

## 2017-04-14 RX ORDER — SODIUM CHLORIDE 9 MG/ML
1000 INJECTION, SOLUTION INTRAVENOUS
Qty: 0 | Refills: 0 | Status: DISCONTINUED | OUTPATIENT
Start: 2017-04-14 | End: 2017-04-14

## 2017-04-14 RX ORDER — ONDANSETRON 8 MG/1
4 TABLET, FILM COATED ORAL EVERY 6 HOURS
Qty: 0 | Refills: 0 | Status: DISCONTINUED | OUTPATIENT
Start: 2017-04-14 | End: 2017-04-14

## 2017-04-14 RX ADMIN — SODIUM CHLORIDE 75 MILLILITER(S): 9 INJECTION, SOLUTION INTRAVENOUS at 21:31

## 2017-04-14 RX ADMIN — Medication 100 MILLIGRAM(S): at 23:22

## 2017-04-14 RX ADMIN — HYDROMORPHONE HYDROCHLORIDE 0.5 MILLIGRAM(S): 2 INJECTION INTRAMUSCULAR; INTRAVENOUS; SUBCUTANEOUS at 17:55

## 2017-04-14 RX ADMIN — HYDROMORPHONE HYDROCHLORIDE 30 MILLILITER(S): 2 INJECTION INTRAMUSCULAR; INTRAVENOUS; SUBCUTANEOUS at 18:32

## 2017-04-14 RX ADMIN — SODIUM CHLORIDE 75 MILLILITER(S): 9 INJECTION, SOLUTION INTRAVENOUS at 17:36

## 2017-04-14 RX ADMIN — HYDROMORPHONE HYDROCHLORIDE 0.5 MILLIGRAM(S): 2 INJECTION INTRAMUSCULAR; INTRAVENOUS; SUBCUTANEOUS at 17:40

## 2017-04-14 RX ADMIN — HYDROMORPHONE HYDROCHLORIDE 30 MILLILITER(S): 2 INJECTION INTRAMUSCULAR; INTRAVENOUS; SUBCUTANEOUS at 20:00

## 2017-04-14 RX ADMIN — HYDROMORPHONE HYDROCHLORIDE 0.5 MILLIGRAM(S): 2 INJECTION INTRAMUSCULAR; INTRAVENOUS; SUBCUTANEOUS at 18:10

## 2017-04-14 NOTE — BRIEF OPERATIVE NOTE - PRE-OP DX
Coagulation defect  04/14/2017    Jeremy Crawley  Iron deficiency anemia due to chronic blood loss  04/14/2017    Jeremy Crawley  Submucous leiomyoma of uterus  04/14/2017    Jeremy Crawley

## 2017-04-14 NOTE — BRIEF OPERATIVE NOTE - PROCEDURE
Total abdominal hysterectomy and bilateral salpingo-oophorectomy (NILAM-BSO)  04/14/2017    Active  [id number]

## 2017-04-14 NOTE — BRIEF OPERATIVE NOTE - POST-OP DX
Iron deficiency anemia due to chronic blood loss  04/14/2017    Jeremy Crawley  Ovarian mass, left  04/14/2017    Jeremy Crawley  Submucous leiomyoma of uterus  04/14/2017    Jeremy Crawley

## 2017-04-14 NOTE — CHART NOTE - NSCHARTNOTEFT_GEN_A_CORE
PGY1 SERVICE NOTE    Patient is POD1 s/p vaginal hysterectomy with associated symptomatic anemia upon admission. Repeat H+H at 8 pm showed 7.4/25.0. Patient received 2 units yesterday. Went to see patient to make sure she is asymptomatic. She reports no SOB, chest pain, palpitations, dizziness, headache, abdominal pain, nausea, vomiting, diarrhea. Patient is resting comfortably. Vitals stable with HR 66, /71, SPO2 100% RA, RR 18. Will monitor and reassess as needed. Discussed with Dr. Reyes.

## 2017-04-15 DIAGNOSIS — R17 UNSPECIFIED JAUNDICE: ICD-10-CM

## 2017-04-15 DIAGNOSIS — D47.3 ESSENTIAL (HEMORRHAGIC) THROMBOCYTHEMIA: ICD-10-CM

## 2017-04-15 LAB
ALBUMIN SERPL ELPH-MCNC: 3 G/DL — LOW (ref 3.3–5)
ALP SERPL-CCNC: 42 U/L — SIGNIFICANT CHANGE UP (ref 40–120)
ALT FLD-CCNC: 14 U/L — SIGNIFICANT CHANGE UP (ref 12–78)
ANION GAP SERPL CALC-SCNC: 9 MMOL/L — SIGNIFICANT CHANGE UP (ref 5–17)
AST SERPL-CCNC: 15 U/L — SIGNIFICANT CHANGE UP (ref 15–37)
BILIRUB SERPL-MCNC: 1.4 MG/DL — HIGH (ref 0.2–1.2)
BUN SERPL-MCNC: 5 MG/DL — LOW (ref 7–23)
CALCIUM SERPL-MCNC: 8.4 MG/DL — LOW (ref 8.5–10.1)
CHLORIDE SERPL-SCNC: 105 MMOL/L — SIGNIFICANT CHANGE UP (ref 96–108)
CO2 SERPL-SCNC: 26 MMOL/L — SIGNIFICANT CHANGE UP (ref 22–31)
CREAT SERPL-MCNC: 0.7 MG/DL — SIGNIFICANT CHANGE UP (ref 0.5–1.3)
GLUCOSE SERPL-MCNC: 95 MG/DL — SIGNIFICANT CHANGE UP (ref 70–99)
HCT VFR BLD CALC: 25.4 % — LOW (ref 34.5–45)
HCT VFR BLD CALC: 27.2 % — LOW (ref 34.5–45)
HGB BLD-MCNC: 7.6 G/DL — LOW (ref 11.5–15.5)
HGB BLD-MCNC: 7.9 G/DL — LOW (ref 11.5–15.5)
MAGNESIUM SERPL-MCNC: 2.2 MG/DL — SIGNIFICANT CHANGE UP (ref 1.8–2.4)
MCHC RBC-ENTMCNC: 20.2 PG — LOW (ref 27–34)
MCHC RBC-ENTMCNC: 20.6 PG — LOW (ref 27–34)
MCHC RBC-ENTMCNC: 29.1 GM/DL — LOW (ref 32–36)
MCHC RBC-ENTMCNC: 29.8 GM/DL — LOW (ref 32–36)
MCV RBC AUTO: 69.2 FL — LOW (ref 80–100)
MCV RBC AUTO: 69.3 FL — LOW (ref 80–100)
PLATELET # BLD AUTO: 464 K/UL — HIGH (ref 150–400)
PLATELET # BLD AUTO: 468 K/UL — HIGH (ref 150–400)
POTASSIUM SERPL-MCNC: 3.9 MMOL/L — SIGNIFICANT CHANGE UP (ref 3.5–5.3)
POTASSIUM SERPL-SCNC: 3.9 MMOL/L — SIGNIFICANT CHANGE UP (ref 3.5–5.3)
PROT SERPL-MCNC: 6.1 G/DL — SIGNIFICANT CHANGE UP (ref 6–8.3)
RBC # BLD: 3.67 M/UL — LOW (ref 3.8–5.2)
RBC # BLD: 3.93 M/UL — SIGNIFICANT CHANGE UP (ref 3.8–5.2)
RBC # FLD: 23.6 % — HIGH (ref 10.3–14.5)
RBC # FLD: 23.9 % — HIGH (ref 10.3–14.5)
SODIUM SERPL-SCNC: 140 MMOL/L — SIGNIFICANT CHANGE UP (ref 135–145)
WBC # BLD: 10.2 K/UL — SIGNIFICANT CHANGE UP (ref 3.8–10.5)
WBC # BLD: 10.6 K/UL — HIGH (ref 3.8–10.5)
WBC # FLD AUTO: 10.2 K/UL — SIGNIFICANT CHANGE UP (ref 3.8–10.5)
WBC # FLD AUTO: 10.6 K/UL — HIGH (ref 3.8–10.5)

## 2017-04-15 PROCEDURE — 99233 SBSQ HOSP IP/OBS HIGH 50: CPT

## 2017-04-15 RX ORDER — HEPARIN SODIUM 5000 [USP'U]/ML
5000 INJECTION INTRAVENOUS; SUBCUTANEOUS EVERY 12 HOURS
Qty: 0 | Refills: 0 | Status: DISCONTINUED | OUTPATIENT
Start: 2017-04-15 | End: 2017-04-16

## 2017-04-15 RX ORDER — SODIUM CHLORIDE 9 MG/ML
3 INJECTION INTRAMUSCULAR; INTRAVENOUS; SUBCUTANEOUS EVERY 8 HOURS
Qty: 0 | Refills: 0 | Status: DISCONTINUED | OUTPATIENT
Start: 2017-04-15 | End: 2017-04-16

## 2017-04-15 RX ORDER — HEPARIN SODIUM 5000 [USP'U]/ML
5000 INJECTION INTRAVENOUS; SUBCUTANEOUS DAILY
Qty: 0 | Refills: 0 | Status: DISCONTINUED | OUTPATIENT
Start: 2017-04-15 | End: 2017-04-15

## 2017-04-15 RX ORDER — ASCORBIC ACID 60 MG
500 TABLET,CHEWABLE ORAL DAILY
Qty: 0 | Refills: 0 | Status: DISCONTINUED | OUTPATIENT
Start: 2017-04-15 | End: 2017-04-16

## 2017-04-15 RX ORDER — FOLIC ACID 0.8 MG
1 TABLET ORAL DAILY
Qty: 0 | Refills: 0 | Status: DISCONTINUED | OUTPATIENT
Start: 2017-04-15 | End: 2017-04-16

## 2017-04-15 RX ORDER — AMLODIPINE BESYLATE 2.5 MG/1
5 TABLET ORAL DAILY
Qty: 0 | Refills: 0 | Status: DISCONTINUED | OUTPATIENT
Start: 2017-04-15 | End: 2017-04-16

## 2017-04-15 RX ORDER — FERROUS SULFATE 325(65) MG
325 TABLET ORAL DAILY
Qty: 0 | Refills: 0 | Status: DISCONTINUED | OUTPATIENT
Start: 2017-04-15 | End: 2017-04-16

## 2017-04-15 RX ORDER — IBUPROFEN 200 MG
400 TABLET ORAL
Qty: 0 | Refills: 0 | Status: DISCONTINUED | OUTPATIENT
Start: 2017-04-15 | End: 2017-04-16

## 2017-04-15 RX ADMIN — SODIUM CHLORIDE 75 MILLILITER(S): 9 INJECTION, SOLUTION INTRAVENOUS at 10:51

## 2017-04-15 RX ADMIN — Medication 500 MILLIGRAM(S): at 21:36

## 2017-04-15 RX ADMIN — HYDROMORPHONE HYDROCHLORIDE 30 MILLILITER(S): 2 INJECTION INTRAMUSCULAR; INTRAVENOUS; SUBCUTANEOUS at 07:15

## 2017-04-15 RX ADMIN — Medication 400 MILLIGRAM(S): at 18:06

## 2017-04-15 RX ADMIN — ONDANSETRON 4 MILLIGRAM(S): 8 TABLET, FILM COATED ORAL at 21:36

## 2017-04-15 RX ADMIN — SODIUM CHLORIDE 3 MILLILITER(S): 9 INJECTION INTRAMUSCULAR; INTRAVENOUS; SUBCUTANEOUS at 14:16

## 2017-04-15 RX ADMIN — Medication 325 MILLIGRAM(S): at 21:36

## 2017-04-15 RX ADMIN — SODIUM CHLORIDE 3 MILLILITER(S): 9 INJECTION INTRAMUSCULAR; INTRAVENOUS; SUBCUTANEOUS at 21:30

## 2017-04-15 RX ADMIN — Medication 1 MILLIGRAM(S): at 21:36

## 2017-04-15 RX ADMIN — Medication 400 MILLIGRAM(S): at 18:36

## 2017-04-15 NOTE — PROGRESS NOTE ADULT - PROBLEM SELECTOR PLAN 4
-BP stable in ED.  -Will hold HTN meds at this time and restart post op if vitals stable.  -Monitor VS.
-restart norvasc with hold parameters  -restart bisprolol and hctz in AM if blood pressure remains stable  -monitor vital signs closely

## 2017-04-15 NOTE — PROGRESS NOTE ADULT - PROBLEM SELECTOR PLAN 2
-Plan as above.
- s/p NILAM-BSO, pod #1, no further bleeding per patient    -Tylenol prn fever, pain. percocet prn pain, discontinue dilaudid as it was causing itching but no rash  -Zofran prn n/v.  -advanced diet to full liquid and then advance as tolerated   -Case discussed with    -f/u further  OB/GYN recs-

## 2017-04-15 NOTE — PROGRESS NOTE ADULT - PROBLEM SELECTOR PLAN 6
-SCDs for DVT ppx.  -Ambulate with assistance.  -Fall Risk Protocol.
-SCDs for DVT ppx.  -OOB to chair  -Fall Risk Protocol.

## 2017-04-15 NOTE — PROGRESS NOTE ADULT - PROBLEM SELECTOR PLAN 7
T bili 1.4, improving  likely reactive, stress induced  will do further work up if remains elevated in AM   patient is asymptomatic

## 2017-04-15 NOTE — PROGRESS NOTE ADULT - PROBLEM SELECTOR PLAN 1
-2/2 Vaginal Bleeding 2/2 Uterine Fibroids.  -Admit to Tele - recommend transfer to Med/Surg after NILAM.  -s/p 2 units pRBCs, will transfuse with additional units as needed.  -Trend H/H, 7.7 this AM.  -Scheduled for NILAM @ 14:30PM  -DASH diet, NPO after midnight.  -Tylenol prn fever, pain.  -Zofran prn n/v.  -c/w Lysteda (advised to bring in home medication as not carried by pharmacy).  -f/u OB/GYN recs-
-2/2 Vaginal Bleeding 2/2 Uterine Fibroids.  -Admitted to Tele - transferred to Med/Surg after NILAM.  -s/p 2 units pRBCs, will transfuse with additional units as needed.  -Trend H/H, 7.6 this AM.  -s/pTAH-BSO, pod #1

## 2017-04-16 ENCOUNTER — TRANSCRIPTION ENCOUNTER (OUTPATIENT)
Age: 52
End: 2017-04-16

## 2017-04-16 VITALS
DIASTOLIC BLOOD PRESSURE: 74 MMHG | OXYGEN SATURATION: 97 % | SYSTOLIC BLOOD PRESSURE: 116 MMHG | TEMPERATURE: 99 F | RESPIRATION RATE: 17 BRPM | HEART RATE: 94 BPM

## 2017-04-16 DIAGNOSIS — D50.0 IRON DEFICIENCY ANEMIA SECONDARY TO BLOOD LOSS (CHRONIC): ICD-10-CM

## 2017-04-16 LAB
ALBUMIN SERPL ELPH-MCNC: 3.5 G/DL — SIGNIFICANT CHANGE UP (ref 3.3–5)
ALP SERPL-CCNC: 47 U/L — SIGNIFICANT CHANGE UP (ref 40–120)
ALT FLD-CCNC: 16 U/L — SIGNIFICANT CHANGE UP (ref 12–78)
ANION GAP SERPL CALC-SCNC: 10 MMOL/L — SIGNIFICANT CHANGE UP (ref 5–17)
APTT BLD: 26.5 SEC — LOW (ref 27.5–37.4)
AST SERPL-CCNC: 20 U/L — SIGNIFICANT CHANGE UP (ref 15–37)
BILIRUB SERPL-MCNC: 1.3 MG/DL — HIGH (ref 0.2–1.2)
BUN SERPL-MCNC: 7 MG/DL — SIGNIFICANT CHANGE UP (ref 7–23)
CALCIUM SERPL-MCNC: 9 MG/DL — SIGNIFICANT CHANGE UP (ref 8.5–10.1)
CHLORIDE SERPL-SCNC: 104 MMOL/L — SIGNIFICANT CHANGE UP (ref 96–108)
CO2 SERPL-SCNC: 26 MMOL/L — SIGNIFICANT CHANGE UP (ref 22–31)
CREAT SERPL-MCNC: 0.92 MG/DL — SIGNIFICANT CHANGE UP (ref 0.5–1.3)
GLUCOSE SERPL-MCNC: 90 MG/DL — SIGNIFICANT CHANGE UP (ref 70–99)
HCT VFR BLD CALC: 25.6 % — LOW (ref 34.5–45)
HGB BLD-MCNC: 7.4 G/DL — LOW (ref 11.5–15.5)
INR BLD: 1.22 RATIO — HIGH (ref 0.88–1.16)
MAGNESIUM SERPL-MCNC: 2.3 MG/DL — SIGNIFICANT CHANGE UP (ref 1.8–2.4)
MCHC RBC-ENTMCNC: 19.8 PG — LOW (ref 27–34)
MCHC RBC-ENTMCNC: 28.9 GM/DL — LOW (ref 32–36)
MCV RBC AUTO: 68.6 FL — LOW (ref 80–100)
PLATELET # BLD AUTO: 483 K/UL — HIGH (ref 150–400)
POTASSIUM SERPL-MCNC: 3.2 MMOL/L — LOW (ref 3.5–5.3)
POTASSIUM SERPL-SCNC: 3.2 MMOL/L — LOW (ref 3.5–5.3)
PROT SERPL-MCNC: 6.6 G/DL — SIGNIFICANT CHANGE UP (ref 6–8.3)
PROTHROM AB SERPL-ACNC: 13.4 SEC — HIGH (ref 9.8–12.7)
RBC # BLD: 3.73 M/UL — LOW (ref 3.8–5.2)
RBC # FLD: 23.6 % — HIGH (ref 10.3–14.5)
SODIUM SERPL-SCNC: 140 MMOL/L — SIGNIFICANT CHANGE UP (ref 135–145)
WBC # BLD: 7.6 K/UL — SIGNIFICANT CHANGE UP (ref 3.8–10.5)
WBC # FLD AUTO: 7.6 K/UL — SIGNIFICANT CHANGE UP (ref 3.8–10.5)

## 2017-04-16 PROCEDURE — 87086 URINE CULTURE/COLONY COUNT: CPT

## 2017-04-16 PROCEDURE — 96374 THER/PROPH/DIAG INJ IV PUSH: CPT | Mod: 59

## 2017-04-16 PROCEDURE — 99239 HOSP IP/OBS DSCHRG MGMT >30: CPT

## 2017-04-16 PROCEDURE — 96375 TX/PRO/DX INJ NEW DRUG ADDON: CPT

## 2017-04-16 PROCEDURE — 85027 COMPLETE CBC AUTOMATED: CPT

## 2017-04-16 PROCEDURE — 86901 BLOOD TYPING SEROLOGIC RH(D): CPT

## 2017-04-16 PROCEDURE — 85730 THROMBOPLASTIN TIME PARTIAL: CPT

## 2017-04-16 PROCEDURE — 86900 BLOOD TYPING SEROLOGIC ABO: CPT

## 2017-04-16 PROCEDURE — 88305 TISSUE EXAM BY PATHOLOGIST: CPT

## 2017-04-16 PROCEDURE — 81001 URINALYSIS AUTO W/SCOPE: CPT

## 2017-04-16 PROCEDURE — 84702 CHORIONIC GONADOTROPIN TEST: CPT

## 2017-04-16 PROCEDURE — 85610 PROTHROMBIN TIME: CPT

## 2017-04-16 PROCEDURE — 93005 ELECTROCARDIOGRAM TRACING: CPT

## 2017-04-16 PROCEDURE — P9016: CPT

## 2017-04-16 PROCEDURE — 86920 COMPATIBILITY TEST SPIN: CPT

## 2017-04-16 PROCEDURE — 88307 TISSUE EXAM BY PATHOLOGIST: CPT

## 2017-04-16 PROCEDURE — 83735 ASSAY OF MAGNESIUM: CPT

## 2017-04-16 PROCEDURE — 99285 EMERGENCY DEPT VISIT HI MDM: CPT | Mod: 25

## 2017-04-16 PROCEDURE — 36430 TRANSFUSION BLD/BLD COMPNT: CPT

## 2017-04-16 PROCEDURE — 71046 X-RAY EXAM CHEST 2 VIEWS: CPT

## 2017-04-16 PROCEDURE — 82248 BILIRUBIN DIRECT: CPT

## 2017-04-16 PROCEDURE — 80053 COMPREHEN METABOLIC PANEL: CPT

## 2017-04-16 PROCEDURE — 82247 BILIRUBIN TOTAL: CPT

## 2017-04-16 PROCEDURE — 84100 ASSAY OF PHOSPHORUS: CPT

## 2017-04-16 PROCEDURE — 88331 PATH CONSLTJ SURG 1 BLK 1SPC: CPT

## 2017-04-16 PROCEDURE — 88108 CYTOPATH CONCENTRATE TECH: CPT

## 2017-04-16 PROCEDURE — 86850 RBC ANTIBODY SCREEN: CPT

## 2017-04-16 RX ORDER — POTASSIUM CHLORIDE 20 MEQ
40 PACKET (EA) ORAL ONCE
Qty: 0 | Refills: 0 | Status: COMPLETED | OUTPATIENT
Start: 2017-04-16 | End: 2017-04-16

## 2017-04-16 RX ORDER — FOLIC ACID 0.8 MG
1 TABLET ORAL
Qty: 15 | Refills: 0 | OUTPATIENT
Start: 2017-04-16 | End: 2017-05-01

## 2017-04-16 RX ORDER — FERROUS SULFATE 325(65) MG
1 TABLET ORAL
Qty: 15 | Refills: 0 | OUTPATIENT
Start: 2017-04-16 | End: 2017-05-01

## 2017-04-16 RX ORDER — ASCORBIC ACID 60 MG
1 TABLET,CHEWABLE ORAL
Qty: 30 | Refills: 0 | OUTPATIENT
Start: 2017-04-16 | End: 2017-05-16

## 2017-04-16 RX ORDER — IBUPROFEN 200 MG
1 TABLET ORAL
Qty: 0 | Refills: 0 | COMMUNITY
Start: 2017-04-16

## 2017-04-16 RX ORDER — OXYCODONE HYDROCHLORIDE 5 MG/1
1 TABLET ORAL
Qty: 20 | Refills: 0 | OUTPATIENT
Start: 2017-04-16 | End: 2017-04-21

## 2017-04-16 RX ADMIN — SODIUM CHLORIDE 3 MILLILITER(S): 9 INJECTION INTRAMUSCULAR; INTRAVENOUS; SUBCUTANEOUS at 06:15

## 2017-04-16 RX ADMIN — Medication 40 MILLIEQUIVALENT(S): at 15:19

## 2017-04-16 RX ADMIN — Medication 325 MILLIGRAM(S): at 12:07

## 2017-04-16 RX ADMIN — Medication 400 MILLIGRAM(S): at 06:14

## 2017-04-16 RX ADMIN — Medication 400 MILLIGRAM(S): at 12:07

## 2017-04-16 RX ADMIN — Medication 400 MILLIGRAM(S): at 06:49

## 2017-04-16 RX ADMIN — AMLODIPINE BESYLATE 5 MILLIGRAM(S): 2.5 TABLET ORAL at 06:14

## 2017-04-16 RX ADMIN — Medication 500 MILLIGRAM(S): at 12:07

## 2017-04-16 RX ADMIN — HEPARIN SODIUM 5000 UNIT(S): 5000 INJECTION INTRAVENOUS; SUBCUTANEOUS at 06:20

## 2017-04-16 RX ADMIN — SODIUM CHLORIDE 3 MILLILITER(S): 9 INJECTION INTRAMUSCULAR; INTRAVENOUS; SUBCUTANEOUS at 13:54

## 2017-04-16 RX ADMIN — Medication 400 MILLIGRAM(S): at 17:53

## 2017-04-16 RX ADMIN — Medication 400 MILLIGRAM(S): at 01:00

## 2017-04-16 RX ADMIN — Medication 400 MILLIGRAM(S): at 00:12

## 2017-04-16 RX ADMIN — Medication 1 MILLIGRAM(S): at 12:07

## 2017-04-16 RX ADMIN — Medication 400 MILLIGRAM(S): at 13:54

## 2017-04-16 NOTE — DISCHARGE NOTE ADULT - PLAN OF CARE
resolution of symptoms please continue pain medications as directed  follow up with  on 4/19/17 follow up with  on 4/19/17  if you have increase in vaginal bleeding please return to ER or call  immediately s/p total abdominal hysterectomy and salping-oophorectomy  follow up with  on 4/19/17 continue home medications please follow up with your outpatient Gastroenterologist or  within 3 days for further work up  you will need an ultrasound of the liver as outpatient  please have your bilirubin levels rechecked within 3 days continue ferrous sulfate, vitamin c, folic acid  please have your hemoglobin rechecked by your primary care physician within 3 days likely reactive  please have your platelets rechecked by your primary care physician within 3 days continue ferrous sulfate, vitamin c, folic acid  please have your hemoglobin rechecked by your primary care physician within 3 days  hemoglobin 7.4 at discharge please continue pain medications as directed  continue incentive spirometry  follow up with  on 4/19/17

## 2017-04-16 NOTE — CONSULT NOTE ADULT - PROBLEM SELECTOR RECOMMENDATION 2
likely secondary to gyn blood loss, however given no prior history of colonoscopy and age approaching 52, she will need a colonoscopy once she is recovered from her surgery, she understands and agrees.

## 2017-04-16 NOTE — DISCHARGE NOTE ADULT - MEDICATION SUMMARY - MEDICATIONS TO TAKE
I will START or STAY ON the medications listed below when I get home from the hospital:    acetaminophen-oxyCODONE 325 mg-5 mg oral tablet  -- 1 tab(s) by mouth every 6 hours, As needed, moderate to severe pain MDD:4 tablets  -- Indication: For moderate to severe pain    ibuprofen 400 mg oral tablet  -- 1 tab(s) by mouth every 6 hours pain   -- Indication: For mild pain     BISOPRL/HCTZ TAB 10/6.25  --  by mouth once a day  -- Indication: For HTN (hypertension)    AMLODIPINE   TAB 5MG  --  by mouth once a day  -- Indication: For HTN (hypertension)    ferrous sulfate 325 mg oral tablet  -- 1 tab(s) by mouth once a day  -- Check with your doctor before becoming pregnant.  Do not chew, break, or crush.  May discolor urine or feces.    -- Indication: For Anemia    ascorbic acid 500 mg oral tablet, chewable  -- 1 tab(s) by mouth once a day  -- Chew tablets before swallowing    -- Indication: For Prophylactic measure    folic acid 1 mg oral tablet  -- 1 tab(s) by mouth once a day  -- Indication: For Anemia

## 2017-04-16 NOTE — DISCHARGE NOTE ADULT - HOSPITAL COURSE
52 yo F with PMH of HTN, Migraines, Fibrocystic breast changes, uterine fibroids presented to the ED with complaints of worsening vaginal bleeding. Pt has experienced vaginal bleeding over the past 5-6 years. Bleeding began to worsen in August-Sept 2016, at which point she had gone to the ED and received 3 units pRBCs before being sent home. In Oct 2016, she had a D&C, and her vaginal bleeding had returned to baseline, as it remained but was manageable, until April 2017. Vaginal bleeding has worsened over the past two weeks and patient had been managed outpatient with her GYN Dr. Dumont. Scheduled for NILAM tomorrow at [facility], but due to complaints of nausea, vomiting, dyspnea on exertion, dizziness, fatigue, dry cough, lack of appetite, and headache, patient came to the ED. Denies fever, chills, chest pain, abd pain, palpitations, constipation, or diarrhea. Otherwise, patient remains stable without further complaints. 52 yo F with PMH of HTN, Migraines, Fibrocystic breast changes, uterine fibroids presented to the ED with complaints of worsening vaginal bleeding. Pt has experienced vaginal bleeding over the past 5-6 years. Bleeding began to worsen in August-Sept 2016, at which point she had gone to the ED and received 3 units pRBCs before being sent home. In Oct 2016, she had a D&C, and her vaginal bleeding had returned to baseline, as it remained but was manageable, until April 2017. Vaginal bleeding has worsened over the past two weeks and patient had been managed outpatient with her GYN Dr. Dumont. Scheduled for NILAM tomorrow at [facility], but due to complaints of nausea, vomiting, dyspnea on exertion, dizziness, fatigue, dry cough, lack of appetite, and headache, patient came to the ED. Denies fever, chills, chest pain, abd pain, palpitations, constipation, or diarrhea. Otherwise, patient remains stable without further complaints.    Patient was admitted to the telemetry floor for vaginal bleeding and scheduled nilam-bso.   was consulted for ob/gyn. She was transfused 2 units prbc and went to the OR on 4/14/17. Patient's vitals remained stable post op, diet was slowly advanced and she tolerated. Hemoglobin remained stable. Blood pressure medications were held on admission due to anemia. Norvasc restarted on pod#1 (other meds held to avoid hypotension) and patient will resume all blood pressure medications at home.  Tbili was mildly elevated trending from 2.5 down to 1.3 on day of discharge. Dbili was 0.3 and Indirect bilirubin was 1.0. Per patient, she has had elevated bilirubin previously and she follows up outpatient with Dr.Nandi Edwards.  consulted for GI and patient cleared for discharge with outpatient follow up and liver ultrasound. Patient cleared for discharge by , and no need to continued lysteda as well. Patient to follow up with heme/onc as outpatient for anemia and to rule out coagulopathy. On day of discharge, patient has slight spotting from vagina and slight pain at incision sites (appropriate). Denies any chest pain, shortness of breath, palpitations, nausea, vomiting, fever, chills, or weakness. Patient is ambulating. Detailed discharge instructions given and patient expressed understanding. Potassium repeleted prior to discharge. Urine culture showed no growth. Case discussed with pmd  at discharge.     4/16/17  PE:  T 98.9, HR 94, /74, RR 17, O2 sat: 97% on RA  Gen: nad, comfortable  HEENT: AT, NC  CV: S1, S2 present, no murmur appreciated  Lungs: cta b/l o w/r/r/r  Abd: soft, nt, nd, +BS, abdominal binder in place  Ext: no cyanosis or edema  Neuro: aaox3 52 yo F with PMH of HTN, Migraines, Fibrocystic breast changes, uterine fibroids presented to the ED with complaints of worsening vaginal bleeding. Pt has experienced vaginal bleeding over the past 5-6 years. Bleeding began to worsen in August-Sept 2016, at which point she had gone to the ED and received 3 units pRBCs before being sent home. In Oct 2016, she had a D&C, and her vaginal bleeding had returned to baseline, as it remained but was manageable, until April 2017. Vaginal bleeding has worsened over the past two weeks and patient had been managed outpatient with her GYN Dr. Dumont. Scheduled for NILAM tomorrow at [facility], but due to complaints of nausea, vomiting, dyspnea on exertion, dizziness, fatigue, dry cough, lack of appetite, and headache, patient came to the ED. Denies fever, chills, chest pain, abd pain, palpitations, constipation, or diarrhea. Otherwise, patient remains stable without further complaints.    Patient was admitted to the telemetry floor for vaginal bleeding and scheduled nilam-bso.   was consulted for ob/gyn. She was transfused 2 units prbc and went to the OR on 4/14/17. Remained stable on tele and was transferred to [facility] post op. Patient's vitals remained stable post op, diet was slowly advanced and she tolerated. Hemoglobin remained stable. Blood pressure medications were held on admission due to anemia. Norvasc restarted on pod#1 (other meds held to avoid hypotension) and patient will resume all blood pressure medications at home.  Tbili was mildly elevated trending from 2.5 down to 1.3 on day of discharge. Dbili was 0.3 and Indirect bilirubin was 1.0. Per patient, she has had elevated bilirubin previously and she follows up outpatient with Dr.Nandi Edwards.  consulted for GI and patient cleared for discharge with outpatient follow up and liver ultrasound. Patient cleared for discharge by , and no need to continued lysteda as well. Patient to follow up with heme/onc as outpatient for anemia and to rule out coagulopathy. On day of discharge, patient has slight spotting from vagina and slight pain at incision sites (appropriate). Denies any chest pain, shortness of breath, palpitations, nausea, vomiting, fever, chills, or weakness. Patient is ambulating. Detailed discharge instructions given and patient expressed understanding. Potassium repeleted prior to discharge. Urine culture showed no growth. Case discussed with pmd  at discharge.     4/16/17  PE:  T 98.9, HR 94, /74, RR 17, O2 sat: 97% on RA  Gen: nad, comfortable  HEENT: AT, NC  CV: S1, S2 present, no murmur appreciated  Lungs: cta b/l o w/r/r/r  Abd: soft, nt, nd, +BS, abdominal binder in place  Ext: no cyanosis or edema  Neuro: aaox3

## 2017-04-16 NOTE — DISCHARGE NOTE ADULT - ADDITIONAL INSTRUCTIONS
-recommend outpatient follow up with hematologist/oncologist for anemia and to rule out coagulopathy. you can follow up with  or a hematologist of your choice  -please have you cbc, cmp (labs) rechecked within 3 days by your primary care physicians   -return to ER if you develop chest pain, shortness of breath, palpitations, nausea, vomiting, abdominal pain, worsening vaginal bleeding or cramping, fever, chills or worsening of any symptoms -recommend outpatient follow up with hematologist/oncologist for anemia and to rule out coagulopathy. you can follow up with  or a hematologist of your choice  -please have you cbc, cmp (labs) rechecked within 3 days by your primary care physician   -return to ER if you develop chest pain, shortness of breath, palpitations, nausea, vomiting, abdominal pain, worsening vaginal bleeding or cramping, fever, chills or worsening of any symptoms

## 2017-04-16 NOTE — DISCHARGE NOTE ADULT - MEDICATION SUMMARY - MEDICATIONS TO STOP TAKING
I will STOP taking the medications listed below when I get home from the hospital:    Lysteda 650 mg oral tablet  -- 2 tab(s) by mouth 3 times a day

## 2017-04-16 NOTE — PROGRESS NOTE ADULT - ASSESSMENT
52 yo F with PMH of HTN, Migraines, Fibrocystic breast changes, uterine fibroids admitted to [facility] with Symptomatic anemia 2/2 vaginal bleeding. Scheduled for NILAM with Dr. Dumont @ 14:30 today.
Stable for discharge home.  Instructions given
52 yo F with PMH of HTN, Migraines, Fibrocystic breast changes, uterine fibroids admitted to Tele with Symptomatic anemia 2/2 vaginal bleeding.

## 2017-04-16 NOTE — PROGRESS NOTE ADULT - SUBJECTIVE AND OBJECTIVE BOX
INTERVAL HPI/OVERNIGHT EVENTS: Patient seen and examined at bedside. s/p 2 units pRBCs. No acute events overnight.    MEDICATIONS  (STANDING):  tranexamic acid 1300milliGRAM(s) Oral three times a day    MEDICATIONS  (PRN):  ondansetron Injectable 4milliGRAM(s) IV Push every 6 hours PRN Nausea and/or Vomiting  acetaminophen   Tablet. 650milliGRAM(s) Oral every 6 hours PRN Mild Pain (1 - 3)  acetaminophen   Tablet 650milliGRAM(s) Oral every 6 hours PRN For Temp greater than 38 C (100.4 F)    Allergies    No Known Allergies    Intolerances    CONSTITUTIONAL: No weakness, fevers or chills  EYES/ENT: No visual changes;  No vertigo or throat pain   NECK: No pain or stiffness  RESPIRATORY: +MUNGUIA, No cough, wheezing, hemoptysis; No shortness of breath  CARDIOVASCULAR: No chest pain or palpitations  GASTROINTESTINAL: No abdominal or epigastric pain. No nausea, vomiting, or hematemesis; No diarrhea or constipation. No melena or hematochezia.  GENITOURINARY: No dysuria, frequency or hematuria  NEUROLOGICAL: No numbness or weakness  SKIN: No itching, burning, rashes, or lesions   All other review of systems is negative unless indicated above.    Vital Signs Last 24 Hrs  T(C): 36.9, Max: 37.9 ( @ 22:15)  T(F): 98.5, Max: 100.2 ( @ 22:15)  HR: 74 (67 - 83)  BP: 130/81 (104/62 - 131/76)  BP(mean): --  RR: 16 (13 - 16)  SpO2: 100% (98% - 100%)    I & Os for current day (as of  @ 13:22)  =============================================  IN: 400 ml / OUT: 0 ml / NET: 400 ml      General: WN/WD NAD  Neurology: A&Ox3, nonfocal, ROGERS x 4  Respiratory: CTA B/L  CV: RRR, S1S2, no murmurs, rubs or gallops  Abdominal: Soft, NT, ND +BS  Extremities: No edema, + peripheral pulses      LABS:                        7.7    6.2   )-----------( 471      ( 2017 06:39 )             25.7     04-14    142  |  108  |  6<L>  ----------------------------<  82  3.8   |  27  |  0.72    Ca    8.2<L>      2017 06:39  Phos  2.9     -14  Mg     2.5     -14    TPro  5.9<L>  /  Alb  3.1<L>  /  TBili  2.4<H>  /  DBili  x   /  AST  12<L>  /  ALT  14  /  AlkPhos  41  04-14    PT/INR - ( 2017 06:39 )   PT: 13.6 sec;   INR: 1.24 ratio         PTT - ( 2017 06:39 )  PTT:25.2 sec  Urinalysis Basic - ( 2017 21:58 )    Color: Yellow / Appearance: Clear / S.020 / pH: x  Gluc: x / Ketone: Negative  / Bili: Negative / Urobili: 1   Blood: x / Protein: 25 mg/dL / Nitrite: Negative   Leuk Esterase: Negative / RBC: x / WBC 0-2   Sq Epi: x / Non Sq Epi: Occasional / Bacteria: Occasional      RADIOLOGY & ADDITIONAL TESTS:
POD 2 s/p TAHBSO for severe anemia, h/o complex endometrial hyperplasia with atypia (resolved).  Offers no complaints.  Tolerating liquid diet.  Exam is unremarkable.  Labs stable, received 2 units PRBC's preop.  Incision C/D/I  - homans.  On Fe, Folate, Vit C  Will arrange outpatient heme eval for elevated PTT.  Office F/U Wednesday.
Post Op Day _1__ of Anesthesia Pain Management Service    SUBJECTIVE: no complaints.   		  OBJECTIVE:  OOB  Pain Score:     0  /10  Therapy:	[ x ] IV PCA	  [  ] Epidural     [  ] s/p Spinal Opioid     [ ] Peripheral nerve block  	  Vital Signs Last 24 Hrs  T(C): 37.2, Max: 37.7 (04-14 @ 13:36)  T(F): 98.9, Max: 99.9 (04-14 @ 13:36)  HR: 75 (62 - 92)  BP: 150/74 (112/71 - 154/75)  BP(mean): --  RR: 17 (15 - 24)  SpO2: 94% (92% - 100%)    ( x ) Alert & Oriented     ( n/a ) No motor/sensory block     ( - ) Nausea     (+mild, resolved, no Rx requested  ) Pruritis     (-  ) Headache    (  ) Catheter Site Unremarkable    (x  )  N/A    Assessment of Catheter Site:	[  ] Intact		[  ] Other:    (x  ) Further Pain Rx Plan:  Oral/parenteral Pain Medications by primary service    COMMENTS: pt states PCA worked well and only had slight itch.  Now resolved      ANTICOAGULATION STATUS:
The patient was interviewed and evaluated  51y Female    T(C): 37.2, Max: 37.7 (04-14 @ 13:36)  HR: 75 (62 - 92)  BP: 150/74 (112/71 - 154/75)  RR: 17 (15 - 24)  SpO2: 94% (92% - 100%)  Wt(kg): --    Pt seen, doing well, no anesthesia complications or complaints noted or reported.   No Nausea    No additional recommendations.     Pain well controlled.  PCA d/c'd this AM by primary service.
Tolerated surgery well, AVSS  Exam unremarkable  wound c/d/i  bandage removed  Labs WNL, INR min elevated.  H/H stable.  Received two units preop.  Heparin prophylaxis tonight if repeat H/H stable.
CHIEF COMPLAINT/INTERVAL HISTORY: 50 yo F with PMH of HTN, Migraines, Fibrocystic breast changes, uterine fibroids admitted with Symptomatic anemia 2/2 vaginal bleeding. Patient was seen and examined at bedside. She is s/p Total abdominal hysterectomy and bilateral salpingo-oophorectomy (NILAM-BSO) pod #1. Patient is resting comfortably. Tolerating clear liquid diet. States she hasn't passed flatus yet. Denies any vaginal bleeding. Denies any chest pain, shortness of breath, palpitations, weakness, abdominal pain, nausea, or vomiting.     REVIEW OF SYSTEMS: Denies any vaginal bleeding. Denies any chest pain, shortness of breath, palpitations, weakness, abdominal pain, nausea, or vomiting.    Vital Signs Last 24 Hrs  T(C): 37.2, Max: 37.7 ( @ 13:36)  T(F): 98.9, Max: 99.9 ( @ 13:36)  HR: 75 (62 - 92)  BP: 150/74 (112/71 - 154/75)  BP(mean): --  RR: 17 (15 - 24)  SpO2: 94% (92% - 100%)    PHYSICAL EXAM:  GENERAL: NAD  HEENT: AT, NC  Chest: CTA bilaterally, no w/r/r/  CV: S1S2, RRR,   GI: soft, +BS, NT/ND, abdominal binder in place, dressing c/d/I   Musculoskeletal: no edema or cyanosis   Neuro: AAOx3   Skin: warm and dry    LABS:                        7.6    10.6  )-----------( 464      ( 15 Apr 2017 09:07 )             25.4     04-15    140  |  105  |  5<L>  ----------------------------<  95  3.9   |  26  |  0.70    Ca    8.4<L>      15 Apr 2017 09:07  Phos  2.9     04-14  Mg     2.5     -14    TPro  6.1  /  Alb  3.0<L>  /  TBili  1.4<H>  /  DBili  x   /  AST  15  /  ALT  14  /  AlkPhos  42  04-15    PT/INR - ( 2017 20:34 )   PT: 13.2 sec;   INR: 1.21 ratio         PTT - ( 2017 20:34 )  PTT:26.1 sec  Urinalysis Basic - ( 2017 21:58 )    Color: Yellow / Appearance: Clear / S.020 / pH: x  Gluc: x / Ketone: Negative  / Bili: Negative / Urobili: 1   Blood: x / Protein: 25 mg/dL / Nitrite: Negative   Leuk Esterase: Negative / RBC: x / WBC 0-2   Sq Epi: x / Non Sq Epi: Occasional / Bacteria: Occasional    Urine culture: no growth             naloxone Injectable 0.1milliGRAM(s) IV Push every 3 minutes PRN  ondansetron Injectable 4milliGRAM(s) IV Push every 6 hours PRN  acetaminophen   Tablet. 650milliGRAM(s) Oral every 6 hours PRN  acetaminophen   Tablet 650milliGRAM(s) Oral every 6 hours PRN  lactated ringers. 1000milliLiter(s) IV Continuous <Continuous>  amLODIPine   Tablet 5milliGRAM(s) Oral daily  oxyCODONE  5 mG/acetaminophen 325 mG 1Tablet(s) Oral every 6 hours PRN

## 2017-04-16 NOTE — CONSULT NOTE ADULT - SUBJECTIVE AND OBJECTIVE BOX
Chief Complaint:  Patient is a 51y old  Female who presents with a chief complaint of vaginal bleeding (16 Apr 2017 15:05)      HPI: 51F who was admitted for vaginal bleeding underwent hysterectomy. patient has been suffering from anemia, thought to be from gyn source, she has seen an outpatient gastroenterogist in past, no prior history of colonoscopy, did have a egd. she also has been having slightly elevated bilirbuin. this is been an issue for her in the past. she states the gastroenterologist she seen in the past told her "everything is ok". 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 650milliGRAM(s) Oral every 6 hours PRN  acetaminophen   Tablet 650milliGRAM(s) Oral every 6 hours PRN  amLODIPine   Tablet 5milliGRAM(s) Oral daily  oxyCODONE  5 mG/acetaminophen 325 mG 1Tablet(s) Oral every 6 hours PRN  sodium chloride 0.9% lock flush 3milliLiter(s) IV Push every 8 hours  ibuprofen  Tablet 400milliGRAM(s) Oral four times a day  heparin  Injectable 5000Unit(s) SubCutaneous every 12 hours  ferrous    sulfate 325milliGRAM(s) Oral daily  ascorbic acid 500milliGRAM(s) Oral daily  folic acid 1milliGRAM(s) Oral daily      PMHX/PSHX:  Fibrocystic breast changes  Environmental allergies  Leiomyoma of uterus, unspecified  Anemia  Fibroid  HTN (hypertension)  H/O endoscopy  S/P tonsillectomy  No significant past surgical history      Family history:  Family history of heart disease (Mother)  Family history of hyperlipidemia (Mother)  Family history of hypertension (Mother)  Family history of diabetes mellitus (Mother)  no family history of liver disease      Social History: works as an rn, denies etoh, ivdu, or smoking     ROS:     General:  No wt loss, fevers, chills, night sweats, fatigue,   Eyes:  Good vision, no reported pain  ENT:  No sore throat, pain, runny nose, dysphagia  CV:  No pain, palpitations, hypo/hypertension  Resp:  No dyspnea, cough, tachypnea, wheezing  GI:  No pain, No nausea, No vomiting, No diarrhea, No constipation, No weight loss, No fever, No pruritis, No rectal bleeding, No tarry stools, No dysphagia,  :  No pain, bleeding, incontinence, nocturia  Muscle:  No pain, weakness  Neuro:  No weakness, tingling, memory problems  Psych:  No fatigue, insomnia, mood problems, depression  Endocrine:  No polyuria, polydipsia, cold/heat intolerance  Heme:  No petechiae, ecchymosis, easy bruisability  Skin:  No rash, tattoos, scars, edema      PHYSICAL EXAM:   Vital Signs:  Vital Signs Last 24 Hrs  T(C): 37.2, Max: 37.2 (04-16 @ 13:10)  T(F): 98.9, Max: 98.9 (04-16 @ 13:10)  HR: 94 (81 - 94)  BP: 116/74 (116/74 - 145/83)  BP(mean): --  RR: 17 (17 - 18)  SpO2: 97% (94% - 97%)  Daily     Daily     GENERAL:  Appears stated age, well-groomed, well-nourished, no distress  HEENT:  NC/AT,  conjunctivae clear and pink, no thyromegaly, nodules, adenopathy, no JVD, sclera -anicteric  CHEST:  Full & symmetric excursion, no increased effort, breath sounds clear  HEART:  Regular rhythm, S1, S2, no murmur/rub/S3/S4, no abdominal bruit, no edema  ABDOMEN:  Soft, non-tender, non-distended, normoactive bowel sounds,  no masses ,no hepato-splenomegaly, no signs of chronic liver disease  EXTEREMITIES:  no cyanosis,clubbing or edema  SKIN:  No rash/erythema/ecchymoses/petechiae/wounds/abscess/warm/dry  NEURO:  Alert, oriented, no asterixis, no tremor, no encephalopathy    LABS:                        7.4    7.6   )-----------( 483      ( 16 Apr 2017 09:28 )             25.6     04-16    140  |  104  |  7   ----------------------------<  90  3.2<L>   |  26  |  0.92    Ca    9.0      16 Apr 2017 09:28  Mg     2.3     04-16    TPro  6.6  /  Alb  3.5  /  TBili  1.3<H>  /  DBili  .30<H>  /  AST  20  /  ALT  16  /  AlkPhos  47  04-16    LIVER FUNCTIONS - ( 16 Apr 2017 09:28 )  Alb: 3.5 g/dL / Pro: 6.6 g/dL / ALK PHOS: 47 U/L / ALT: 16 U/L / AST: 20 U/L / GGT: x           PT/INR - ( 16 Apr 2017 09:28 )   PT: 13.4 sec;   INR: 1.22 ratio         PTT - ( 16 Apr 2017 09:28 )  PTT:26.5 sec        Imaging:

## 2017-04-16 NOTE — DISCHARGE NOTE ADULT - CARE PLAN
Principal Discharge DX:	S/P total abdominal hysterectomy and bilateral salpingo-oophorectomy  Goal:	resolution of symptoms  Instructions for follow-up, activity and diet:	please continue pain medications as directed  follow up with  on 4/19/17  Secondary Diagnosis:	Vaginal bleeding  Instructions for follow-up, activity and diet:	follow up with  on 4/19/17  if you have increase in vaginal bleeding please return to ER or call  immediately  Secondary Diagnosis:	Fibroid  Instructions for follow-up, activity and diet:	s/p total abdominal hysterectomy and salping-oophorectomy  follow up with  on 4/19/17  Secondary Diagnosis:	HTN (hypertension)  Instructions for follow-up, activity and diet:	continue home medications  Secondary Diagnosis:	Elevated bilirubin  Instructions for follow-up, activity and diet:	please follow up with your outpatient Gastroenterologist or  within 3 days for further work up  you will need an ultrasound of the liver as outpatient  please have your bilirubin levels rechecked within 3 days  Secondary Diagnosis:	Anemia  Instructions for follow-up, activity and diet:	continue ferrous sulfate, vitamin c, folic acid  please have your hemoglobin rechecked by your primary care physician within 3 days  Secondary Diagnosis:	Thrombocytosis  Instructions for follow-up, activity and diet:	likely reactive  please have your platelets rechecked by your primary care physician within 3 days Principal Discharge DX:	S/P total abdominal hysterectomy and bilateral salpingo-oophorectomy  Goal:	resolution of symptoms  Instructions for follow-up, activity and diet:	please continue pain medications as directed  follow up with  on 4/19/17  Secondary Diagnosis:	Vaginal bleeding  Instructions for follow-up, activity and diet:	follow up with  on 4/19/17  if you have increase in vaginal bleeding please return to ER or call  immediately  Secondary Diagnosis:	Fibroid  Instructions for follow-up, activity and diet:	s/p total abdominal hysterectomy and salping-oophorectomy  follow up with  on 4/19/17  Secondary Diagnosis:	HTN (hypertension)  Instructions for follow-up, activity and diet:	continue home medications  Secondary Diagnosis:	Elevated bilirubin  Instructions for follow-up, activity and diet:	please follow up with your outpatient Gastroenterologist or  within 3 days for further work up  you will need an ultrasound of the liver as outpatient  please have your bilirubin levels rechecked within 3 days  Secondary Diagnosis:	Anemia  Instructions for follow-up, activity and diet:	continue ferrous sulfate, vitamin c, folic acid  please have your hemoglobin rechecked by your primary care physician within 3 days  hemoglobin 7.4 at discharge  Secondary Diagnosis:	Thrombocytosis  Instructions for follow-up, activity and diet:	likely reactive  please have your platelets rechecked by your primary care physician within 3 days Principal Discharge DX:	S/P total abdominal hysterectomy and bilateral salpingo-oophorectomy  Goal:	resolution of symptoms  Instructions for follow-up, activity and diet:	please continue pain medications as directed  continue incentive spirometry  follow up with  on 4/19/17  Secondary Diagnosis:	Vaginal bleeding  Instructions for follow-up, activity and diet:	follow up with  on 4/19/17  if you have increase in vaginal bleeding please return to ER or call  immediately  Secondary Diagnosis:	Fibroid  Instructions for follow-up, activity and diet:	s/p total abdominal hysterectomy and salping-oophorectomy  follow up with  on 4/19/17  Secondary Diagnosis:	HTN (hypertension)  Instructions for follow-up, activity and diet:	continue home medications  Secondary Diagnosis:	Elevated bilirubin  Instructions for follow-up, activity and diet:	please follow up with your outpatient Gastroenterologist or  within 3 days for further work up  you will need an ultrasound of the liver as outpatient  please have your bilirubin levels rechecked within 3 days  Secondary Diagnosis:	Anemia  Instructions for follow-up, activity and diet:	continue ferrous sulfate, vitamin c, folic acid  please have your hemoglobin rechecked by your primary care physician within 3 days  hemoglobin 7.4 at discharge  Secondary Diagnosis:	Thrombocytosis  Instructions for follow-up, activity and diet:	likely reactive  please have your platelets rechecked by your primary care physician within 3 days

## 2017-04-16 NOTE — CONSULT NOTE ADULT - PROBLEM SELECTOR RECOMMENDATION 9
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 she understand and agrees, I gave her my card and told her to follow up as outpatient she states she will do so.

## 2017-04-16 NOTE — DISCHARGE NOTE ADULT - PATIENT PORTAL LINK FT
“You can access the [facility] Patient Portal, offered by Health system, by registering with the following website: [email or web address]”

## 2017-04-16 NOTE — DISCHARGE NOTE ADULT - CARE PROVIDER_API CALL
Jeremy Dumont (MD), Obstetrics and Gynecology  [address]  [place] [address]  Phone: [phone]  Fax: [phone]    Grace Gibson), Internal Medicine  [address]  [place] [address]  Phone: [phone]  Fax: [phone]    Beni Murillo (DO), Internal Medicine  [address] [place] [address]  Phone: [phone]  Fax: [phone]    Bentley Gibbs (MD), Hematology; Internal Medicine; Medical Oncology  [address]  [place] [address]  Phone: [phone]  Fax: [phone]

## 2017-04-18 LAB
NON-GYN CYTOLOGY SPEC: SIGNIFICANT CHANGE UP
SURGICAL PATHOLOGY FINAL REPORT - CH: SIGNIFICANT CHANGE UP

## 2017-04-19 DIAGNOSIS — I10 ESSENTIAL (PRIMARY) HYPERTENSION: ICD-10-CM

## 2017-04-19 DIAGNOSIS — D25.9 LEIOMYOMA OF UTERUS, UNSPECIFIED: ICD-10-CM

## 2017-04-19 DIAGNOSIS — R63.0 ANOREXIA: ICD-10-CM

## 2017-04-19 DIAGNOSIS — E87.6 HYPOKALEMIA: ICD-10-CM

## 2017-04-19 DIAGNOSIS — R94.8 ABNORMAL RESULTS OF FUNCTION STUDIES OF OTHER ORGANS AND SYSTEMS: ICD-10-CM

## 2017-04-19 DIAGNOSIS — D64.89 OTHER SPECIFIED ANEMIAS: ICD-10-CM

## 2017-04-19 DIAGNOSIS — G43.909 MIGRAINE, UNSPECIFIED, NOT INTRACTABLE, WITHOUT STATUS MIGRAINOSUS: ICD-10-CM

## 2017-04-19 DIAGNOSIS — R19.00 INTRA-ABDOMINAL AND PELVIC SWELLING, MASS AND LUMP, UNSPECIFIED SITE: ICD-10-CM

## 2017-04-19 DIAGNOSIS — D47.3 ESSENTIAL (HEMORRHAGIC) THROMBOCYTHEMIA: ICD-10-CM

## 2017-06-01 ENCOUNTER — OTHER (OUTPATIENT)
Age: 52
End: 2017-06-01

## 2018-09-11 NOTE — PRE-OP CHECKLIST - NEEDLE GAUGE
20
Principal Discharge DX:	Bradycardia  Goal:	Prevent complications  Assessment and plan of treatment:	You are currently refusing pacemaker. Please follow up with Dr. Rocha and Dr. Hammond at your scheduled appointments  Secondary Diagnosis:	Chest pain  Goal:	rule out acute coronary syndrome  Assessment and plan of treatment:	Echocardiogram unremarkable, cardiac angio with normal arteries.

## 2023-11-10 NOTE — H&P ADULT - NSHPLANGLIMITEDENGLISH_GEN_A_CORE
Mimi Arevalosammbabatunde comes into clinic today at the request of Dr. Trenton Barrett, Ordering Provider for PFT    This service provided today was under the supervising provider of the day Dr. Starks, who was available if needed.    Juaquin Argueta, RT   
No

## 2024-08-21 NOTE — PATIENT PROFILE ADULT. - NS PRO ABUSE SCREEN SUSPICION NEGLECT YN
Detail Level: Detailed Depth Of Biopsy: dermis Was A Bandage Applied: Yes Size Of Lesion In Cm: 0 Biopsy Type: H and E Biopsy Method: Dermablade Anesthesia Type: 1% lidocaine with epinephrine and a 1:10 solution of 8.4% sodium bicarbonate Anesthesia Volume In Cc: 0.5 Hemostasis: Drysol Wound Care: Petrolatum Dressing: bandage Destruction After The Procedure: No Type Of Destruction Used: Electrodesiccation and Curettage Curettage Text: The wound bed was treated with curettage after the biopsy was performed. Cryotherapy Text: The wound bed was treated with cryotherapy after the biopsy was performed. Electrodesiccation Text: The wound bed was treated with electrodesiccation after the biopsy was performed. Electrodesiccation And Curettage Text: The wound bed was treated with electrodesiccation and curettage after the biopsy was performed. Silver Nitrate Text: The wound bed was treated with silver nitrate after the biopsy was performed. Lab: [id number] Lab Facility: [id number] Consent: Written consent was obtained and risks were reviewed including but not limited to scarring, infection, bleeding, scabbing, incomplete removal, nerve damage and allergy to anesthesia. Post-Care Instructions: I reviewed with the patient in detail post-care instructions. Patient is to keep the biopsy site dry overnight, and then apply bacitracin twice daily until healed. Patient may apply hydrogen peroxide soaks to remove any crusting. Notification Instructions: Patient will be notified of biopsy results. However, patient instructed to call the office if not contacted within 2 weeks. Billing Type: Third-Party Bill Information: Selecting Yes will display possible errors in your note based on the variables you have selected. This validation is only offered as a suggestion for you. PLEASE NOTE THAT THE VALIDATION TEXT WILL BE REMOVED WHEN YOU FINALIZE YOUR NOTE. IF YOU WANT TO FAX A PRELIMINARY NOTE YOU WILL NEED TO TOGGLE THIS TO 'NO' IF YOU DO NOT WANT IT IN YOUR FAXED NOTE. no